# Patient Record
Sex: MALE | Race: WHITE
[De-identification: names, ages, dates, MRNs, and addresses within clinical notes are randomized per-mention and may not be internally consistent; named-entity substitution may affect disease eponyms.]

---

## 2018-10-16 ENCOUNTER — APPOINTMENT (OUTPATIENT)
Dept: FAMILY MEDICINE | Facility: CLINIC | Age: 78
End: 2018-10-16
Payer: MEDICARE

## 2018-10-16 VITALS
HEIGHT: 72 IN | WEIGHT: 185 LBS | HEART RATE: 87 BPM | RESPIRATION RATE: 16 BRPM | OXYGEN SATURATION: 99 % | BODY MASS INDEX: 25.06 KG/M2 | DIASTOLIC BLOOD PRESSURE: 68 MMHG | SYSTOLIC BLOOD PRESSURE: 124 MMHG

## 2018-10-16 DIAGNOSIS — Z00.00 ENCOUNTER FOR GENERAL ADULT MEDICAL EXAMINATION W/OUT ABNORMAL FINDINGS: ICD-10-CM

## 2018-10-16 DIAGNOSIS — M54.12 RADICULOPATHY, CERVICAL REGION: ICD-10-CM

## 2018-10-16 DIAGNOSIS — Z82.3 FAMILY HISTORY OF STROKE: ICD-10-CM

## 2018-10-16 PROCEDURE — 99203 OFFICE O/P NEW LOW 30 MIN: CPT

## 2018-10-16 RX ORDER — ASPIRIN 325 MG/1
325 TABLET ORAL
Refills: 0 | Status: ACTIVE | COMMUNITY

## 2018-10-16 NOTE — PHYSICAL EXAM
[No Acute Distress] : no acute distress [Well Nourished] : well nourished [Well Developed] : well developed [Normal Sclera/Conjunctiva] : normal sclera/conjunctiva [PERRL] : pupils equal round and reactive to light [EOMI] : extraocular movements intact [de-identified] : neck decreased rotation to left, decreased flexion, left shoulder decrased rotation on internal abduction, no joint deformity, no swellin gof joints.

## 2018-10-16 NOTE — ASSESSMENT
[FreeTextEntry1] : oa, most likely cervical strain with radiculopathy, possible shoulder injury\par PT for evaluation/treatment follow up if no improvement

## 2018-10-16 NOTE — HEALTH RISK ASSESSMENT
[] : No [Patient reported colonoscopy was normal] : Patient reported colonoscopy was normal [ColonoscopyDate] : 06/2012

## 2018-10-16 NOTE — HISTORY OF PRESENT ILLNESS
[FreeTextEntry8] : jason has not gone to doctor in years, only to urgi care when needed \par for past few months has had pain in left shoulder, left biceps, some aches however feels achy in joints, knees, elbows feels better as day goes on no swelling in joints

## 2018-10-26 ENCOUNTER — LABORATORY RESULT (OUTPATIENT)
Age: 78
End: 2018-10-26

## 2018-10-26 ENCOUNTER — OTHER (OUTPATIENT)
Age: 78
End: 2018-10-26

## 2018-10-26 DIAGNOSIS — R20.0 ANESTHESIA OF SKIN: ICD-10-CM

## 2018-10-27 ENCOUNTER — LABORATORY RESULT (OUTPATIENT)
Age: 78
End: 2018-10-27

## 2018-10-28 ENCOUNTER — MOBILE ON CALL (OUTPATIENT)
Age: 78
End: 2018-10-28

## 2018-10-29 LAB
ALBUMIN SERPL ELPH-MCNC: 4.1 G/DL
ALP BLD-CCNC: 60 U/L
ALT SERPL-CCNC: 11 U/L
ANION GAP SERPL CALC-SCNC: 14 MMOL/L
AST SERPL-CCNC: 17 U/L
BASOPHILS # BLD AUTO: 0 K/UL
BASOPHILS NFR BLD AUTO: 0 %
BILIRUB SERPL-MCNC: 0.8 MG/DL
BUN SERPL-MCNC: 13 MG/DL
CALCIUM SERPL-MCNC: 9.1 MG/DL
CHLORIDE SERPL-SCNC: 102 MMOL/L
CHOLEST SERPL-MCNC: 111 MG/DL
CHOLEST/HDLC SERPL: 2.4 RATIO
CO2 SERPL-SCNC: 23 MMOL/L
CREAT SERPL-MCNC: 0.76 MG/DL
EOSINOPHIL # BLD AUTO: 0.07 K/UL
EOSINOPHIL NFR BLD AUTO: 2 %
FERRITIN SERPL-MCNC: 826 NG/ML
FOLATE SERPL-MCNC: 17 NG/ML
GLUCOSE SERPL-MCNC: 114 MG/DL
HCT VFR BLD CALC: 28.1 %
HDLC SERPL-MCNC: 46 MG/DL
HGB BLD-MCNC: 9.5 G/DL
IMM GRANULOCYTES NFR BLD AUTO: 0.3 %
IRON SATN MFR SERPL: 61 %
IRON SERPL-MCNC: 143 UG/DL
LDLC SERPL CALC-MCNC: 57 MG/DL
LYMPHOCYTES # BLD AUTO: 1.14 K/UL
LYMPHOCYTES NFR BLD AUTO: 33.2 %
MAN DIFF?: NORMAL
MCHC RBC-ENTMCNC: 33.8 GM/DL
MCHC RBC-ENTMCNC: 39.1 PG
MCV RBC AUTO: 115.6 FL
MONOCYTES # BLD AUTO: 0.68 K/UL
MONOCYTES NFR BLD AUTO: 19.8 %
NEUTROPHILS # BLD AUTO: 1.53 K/UL
NEUTROPHILS NFR BLD AUTO: 44.7 %
PLATELET # BLD AUTO: 309 K/UL
POTASSIUM SERPL-SCNC: 4.4 MMOL/L
PROT SERPL-MCNC: 6.8 G/DL
RBC # BLD: 2.43 M/UL
RBC # FLD: 15 %
SODIUM SERPL-SCNC: 139 MMOL/L
TIBC SERPL-MCNC: 236 UG/DL
TRIGL SERPL-MCNC: 42 MG/DL
UIBC SERPL-MCNC: 93 UG/DL
VIT B12 SERPL-MCNC: 414 PG/ML
WBC # FLD AUTO: 3.43 K/UL

## 2018-10-31 ENCOUNTER — APPOINTMENT (OUTPATIENT)
Dept: HEMATOLOGY ONCOLOGY | Facility: CLINIC | Age: 78
End: 2018-10-31
Payer: MEDICARE

## 2018-10-31 VITALS
TEMPERATURE: 98.7 F | SYSTOLIC BLOOD PRESSURE: 133 MMHG | BODY MASS INDEX: 24.92 KG/M2 | DIASTOLIC BLOOD PRESSURE: 68 MMHG | HEIGHT: 72 IN | HEART RATE: 66 BPM | WEIGHT: 184 LBS

## 2018-10-31 DIAGNOSIS — M19.90 UNSPECIFIED OSTEOARTHRITIS, UNSPECIFIED SITE: ICD-10-CM

## 2018-10-31 DIAGNOSIS — Z78.9 OTHER SPECIFIED HEALTH STATUS: ICD-10-CM

## 2018-10-31 DIAGNOSIS — D72.829 ELEVATED WHITE BLOOD CELL COUNT, UNSPECIFIED: ICD-10-CM

## 2018-10-31 DIAGNOSIS — Z63.4 DISAPPEARANCE AND DEATH OF FAMILY MEMBER: ICD-10-CM

## 2018-10-31 DIAGNOSIS — Z87.81 PERSONAL HISTORY OF (HEALED) TRAUMATIC FRACTURE: ICD-10-CM

## 2018-10-31 DIAGNOSIS — Z82.49 FAMILY HISTORY OF ISCHEMIC HEART DISEASE AND OTHER DISEASES OF THE CIRCULATORY SYSTEM: ICD-10-CM

## 2018-10-31 DIAGNOSIS — D70.8 OTHER NEUTROPENIA: ICD-10-CM

## 2018-10-31 DIAGNOSIS — D63.8 ANEMIA IN OTHER CHRONIC DISEASES CLASSIFIED ELSEWHERE: ICD-10-CM

## 2018-10-31 DIAGNOSIS — Z82.3 FAMILY HISTORY OF STROKE: ICD-10-CM

## 2018-10-31 DIAGNOSIS — Z60.2 PROBLEMS RELATED TO LIVING ALONE: ICD-10-CM

## 2018-10-31 PROCEDURE — 99205 OFFICE O/P NEW HI 60 MIN: CPT

## 2018-10-31 RX ORDER — ASPIRIN 81 MG
81 TABLET, DELAYED RELEASE (ENTERIC COATED) ORAL
Refills: 0 | Status: ACTIVE | COMMUNITY

## 2018-10-31 SDOH — SOCIAL STABILITY - SOCIAL INSECURITY: DISSAPEARANCE AND DEATH OF FAMILY MEMBER: Z63.4

## 2018-10-31 SDOH — SOCIAL STABILITY - SOCIAL INSECURITY: PROBLEMS RELATED TO LIVING ALONE: Z60.2

## 2018-11-09 ENCOUNTER — LABORATORY RESULT (OUTPATIENT)
Age: 78
End: 2018-11-09

## 2018-11-09 LAB
ALBUMIN SERPL ELPH-MCNC: 4.1 G/DL
ALP BLD-CCNC: 63 U/L
ALT SERPL-CCNC: 14 U/L
ANION GAP SERPL CALC-SCNC: 12 MMOL/L
AST SERPL-CCNC: 17 U/L
BASOPHILS # BLD AUTO: 0.01 K/UL
BASOPHILS NFR BLD AUTO: 0.3 %
BILIRUB SERPL-MCNC: 0.5 MG/DL
BUN SERPL-MCNC: 19 MG/DL
CALCIUM SERPL-MCNC: 8.9 MG/DL
CHLORIDE SERPL-SCNC: 103 MMOL/L
CO2 SERPL-SCNC: 22 MMOL/L
CREAT SERPL-MCNC: 0.84 MG/DL
DEPRECATED KAPPA LC FREE/LAMBDA SER: 1.05 RATIO
DEPRECATED KAPPA LC FREE/LAMBDA SER: 1.05 RATIO
EOSINOPHIL # BLD AUTO: 0.09 K/UL
EOSINOPHIL NFR BLD AUTO: 2.9 %
FERRITIN SERPL-MCNC: 789 NG/ML
GLUCOSE SERPL-MCNC: 136 MG/DL
HCT VFR BLD CALC: 29 %
HGB BLD-MCNC: 9.5 G/DL
IGA SER QL IEP: 318 MG/DL
IGG SER QL IEP: 983 MG/DL
IGM SER QL IEP: 53 MG/DL
IMM GRANULOCYTES NFR BLD AUTO: 0.6 %
KAPPA LC CSF-MCNC: 2.19 MG/DL
KAPPA LC CSF-MCNC: 2.19 MG/DL
KAPPA LC SERPL-MCNC: 2.3 MG/DL
KAPPA LC SERPL-MCNC: 2.3 MG/DL
LDH SERPL-CCNC: 205 U/L
LYMPHOCYTES # BLD AUTO: 0.99 K/UL
LYMPHOCYTES NFR BLD AUTO: 31.8 %
MAN DIFF?: NORMAL
MCHC RBC-ENTMCNC: 32.8 GM/DL
MCHC RBC-ENTMCNC: 38.3 PG
MCV RBC AUTO: 116.9 FL
MONOCYTES # BLD AUTO: 0.58 K/UL
MONOCYTES NFR BLD AUTO: 18.6 %
NEUTROPHILS # BLD AUTO: 1.42 K/UL
NEUTROPHILS NFR BLD AUTO: 45.8 %
PLATELET # BLD AUTO: 303 K/UL
POTASSIUM SERPL-SCNC: 4.8 MMOL/L
PROT SERPL-MCNC: 7.1 G/DL
RBC # BLD: 2.48 M/UL
RBC # FLD: 15.3 %
SODIUM SERPL-SCNC: 138 MMOL/L
WBC # FLD AUTO: 3.11 K/UL

## 2018-11-14 LAB
ALBUMIN MFR SERPL ELPH: 58.1 %
ALBUMIN SERPL-MCNC: 4.1 G/DL
ALBUMIN/GLOB SERPL: 1.4 RATIO
ALPHA1 GLOB MFR SERPL ELPH: 5.3 %
ALPHA1 GLOB SERPL ELPH-MCNC: 0.4 G/DL
ALPHA2 GLOB MFR SERPL ELPH: 10.4 %
ALPHA2 GLOB SERPL ELPH-MCNC: 0.7 G/DL
B-GLOBULIN MFR SERPL ELPH: 11.3 %
B-GLOBULIN SERPL ELPH-MCNC: 0.8 G/DL
GAMMA GLOB FLD ELPH-MCNC: 1.1 G/DL
GAMMA GLOB MFR SERPL ELPH: 14.9 %
INTERPRETATION SERPL IEP-IMP: NORMAL
JAK2 GENE MUT ANL BLD/T: NORMAL
M PROTEIN MFR SERPL ELPH: NORMAL %
M PROTEIN SPEC IFE-MCNC: NORMAL
MONOCLON BAND OBS SERPL: NORMAL G/DL
PROT SERPL-MCNC: 7.1 G/DL
PROT SERPL-MCNC: 7.1 G/DL

## 2018-11-19 ENCOUNTER — FORM ENCOUNTER (OUTPATIENT)
Age: 78
End: 2018-11-19

## 2018-11-20 ENCOUNTER — APPOINTMENT (OUTPATIENT)
Dept: HEMATOLOGY ONCOLOGY | Facility: CLINIC | Age: 78
End: 2018-11-20
Payer: MEDICARE

## 2018-11-20 ENCOUNTER — OUTPATIENT (OUTPATIENT)
Dept: OUTPATIENT SERVICES | Facility: HOSPITAL | Age: 78
LOS: 1 days | End: 2018-11-20
Payer: MEDICARE

## 2018-11-20 VITALS — HEART RATE: 72 BPM | SYSTOLIC BLOOD PRESSURE: 123 MMHG | DIASTOLIC BLOOD PRESSURE: 88 MMHG | TEMPERATURE: 97.6 F

## 2018-11-20 DIAGNOSIS — D47.2 MONOCLONAL GAMMOPATHY: ICD-10-CM

## 2018-11-20 PROCEDURE — 77074 RADEX OSSEOUS SURVEY LMTD: CPT

## 2018-11-20 PROCEDURE — 99214 OFFICE O/P EST MOD 30 MIN: CPT

## 2018-11-20 PROCEDURE — 77074 RADEX OSSEOUS SURVEY LMTD: CPT | Mod: 26

## 2018-11-20 NOTE — HISTORY OF PRESENT ILLNESS
[de-identified] : 77 years old  male referred for hematologic evaluation for anemia and leukopenia.  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 His symptoms (muscular stiffness, back and joint pain) resolved since he was started on low dose Prednisone by rheumatologist. Appetite and weight increased since the start of steroid therapy. Medication list reviewed; stopped taking Voltaren for pain.Appears in good spirits, not toxic. Last colonoscopy done 5 years ago, reportedly negative. Continues to work in construction. Accompanied by Deanna, one of his daughters in law.

## 2018-11-20 NOTE — REASON FOR VISIT
[Follow-Up Visit] : a follow-up visit for [Family Member] : family member [FreeTextEntry2] : monoclonal gammopathy

## 2018-11-20 NOTE — ASSESSMENT
[FreeTextEntry1] : Mr. MAE's questions were answered to his satisfaction. He expressed his understanding and willingness to comply with the above recommendations, and will return to the new office location in 1 month.\par \par

## 2018-11-20 NOTE — REVIEW OF SYSTEMS
[Fatigue] : fatigue [Recent Change In Weight] : ~T recent weight change [Joint Pain] : joint pain [Joint Stiffness] : joint stiffness [Muscle Pain] : muscle pain [Negative] : Integumentary [Chills] : no chills [Night Sweats] : no night sweats [FreeTextEntry2] : lost 15 lbs in the past year [de-identified] : anemia and leukopenia

## 2018-11-20 NOTE — PHYSICAL EXAM
[Ambulatory and capable of all self care but unable to carry out any work activities] : Status 2- Ambulatory and capable of all self care but unable to carry out any work activities. Up and about more than 50% of waking hours [Normal] : normoactive bowel sounds, soft and nontender, no hepatosplenomegaly or masses appreciated [de-identified] : limited range of motion in both shoulders and hips.

## 2018-12-20 ENCOUNTER — APPOINTMENT (OUTPATIENT)
Dept: HEMATOLOGY ONCOLOGY | Facility: CLINIC | Age: 78
End: 2018-12-20

## 2019-01-27 ENCOUNTER — EMERGENCY (EMERGENCY)
Facility: HOSPITAL | Age: 79
LOS: 0 days | Discharge: HOME | End: 2019-01-27
Attending: EMERGENCY MEDICINE | Admitting: NURSE PRACTITIONER
Payer: MEDICARE

## 2019-01-27 VITALS
HEART RATE: 85 BPM | DIASTOLIC BLOOD PRESSURE: 74 MMHG | TEMPERATURE: 98 F | OXYGEN SATURATION: 97 % | RESPIRATION RATE: 16 BRPM | SYSTOLIC BLOOD PRESSURE: 144 MMHG

## 2019-01-27 VITALS — WEIGHT: 184.97 LBS

## 2019-01-27 DIAGNOSIS — S01.81XA LACERATION WITHOUT FOREIGN BODY OF OTHER PART OF HEAD, INITIAL ENCOUNTER: ICD-10-CM
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
## 2019-01-27 DIAGNOSIS — S60.511A ABRASION OF RIGHT HAND, INITIAL ENCOUNTER: ICD-10-CM

## 2019-01-27 DIAGNOSIS — H26.9 UNSPECIFIED CATARACT: ICD-10-CM

## 2019-01-27 DIAGNOSIS — M79.7 FIBROMYALGIA: ICD-10-CM
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
## 2019-01-27 DIAGNOSIS — S50.311A ABRASION OF RIGHT ELBOW, INITIAL ENCOUNTER: ICD-10-CM

## 2019-01-27 DIAGNOSIS — S09.90XA UNSPECIFIED INJURY OF HEAD, INITIAL ENCOUNTER: ICD-10-CM

## 2019-01-27 LAB
ALBUMIN SERPL ELPH-MCNC: 3.9 G/DL — SIGNIFICANT CHANGE UP (ref 3.3–5)
ALP SERPL-CCNC: 65 U/L — SIGNIFICANT CHANGE UP (ref 40–120)
ALT FLD-CCNC: 23 U/L — SIGNIFICANT CHANGE UP (ref 12–78)
ANION GAP SERPL CALC-SCNC: 7 MMOL/L — SIGNIFICANT CHANGE UP (ref 5–17)
ANISOCYTOSIS BLD QL: SLIGHT — SIGNIFICANT CHANGE UP
APTT BLD: 23.9 SEC — LOW (ref 27.5–36.3)
AST SERPL-CCNC: 19 U/L — SIGNIFICANT CHANGE UP (ref 15–37)
BASOPHILS # BLD AUTO: 0.01 K/UL — SIGNIFICANT CHANGE UP (ref 0–0.2)
BASOPHILS NFR BLD AUTO: 0.3 % — SIGNIFICANT CHANGE UP (ref 0–2)
BILIRUB SERPL-MCNC: 1.1 MG/DL — SIGNIFICANT CHANGE UP (ref 0.2–1.2)
BLD GP AB SCN SERPL QL: SIGNIFICANT CHANGE UP
BUN SERPL-MCNC: 17 MG/DL — SIGNIFICANT CHANGE UP (ref 7–23)
CALCIUM SERPL-MCNC: 8.5 MG/DL — SIGNIFICANT CHANGE UP (ref 8.5–10.1)
CHLORIDE SERPL-SCNC: 110 MMOL/L — HIGH (ref 96–108)
CO2 SERPL-SCNC: 25 MMOL/L — SIGNIFICANT CHANGE UP (ref 22–31)
CREAT SERPL-MCNC: 0.96 MG/DL — SIGNIFICANT CHANGE UP (ref 0.5–1.3)
EOSINOPHIL # BLD AUTO: 0 K/UL — SIGNIFICANT CHANGE UP (ref 0–0.5)
EOSINOPHIL NFR BLD AUTO: 0 % — SIGNIFICANT CHANGE UP (ref 0–6)
GLUCOSE SERPL-MCNC: 116 MG/DL — HIGH (ref 70–99)
HCT VFR BLD CALC: 31.1 % — LOW (ref 39–50)
HGB BLD-MCNC: 10.7 G/DL — LOW (ref 13–17)
IMM GRANULOCYTES NFR BLD AUTO: 0.6 % — SIGNIFICANT CHANGE UP (ref 0–1.5)
INR BLD: 1.07 RATIO — SIGNIFICANT CHANGE UP (ref 0.88–1.16)
LG PLATELETS BLD QL AUTO: SLIGHT — SIGNIFICANT CHANGE UP
LYMPHOCYTES # BLD AUTO: 1.01 K/UL — SIGNIFICANT CHANGE UP (ref 1–3.3)
LYMPHOCYTES # BLD AUTO: 28.3 % — SIGNIFICANT CHANGE UP (ref 13–44)
MACROCYTES BLD QL: SIGNIFICANT CHANGE UP
MANUAL SMEAR VERIFICATION: SIGNIFICANT CHANGE UP
MCHC RBC-ENTMCNC: 34.4 GM/DL — SIGNIFICANT CHANGE UP (ref 32–36)
MCHC RBC-ENTMCNC: 41 PG — HIGH (ref 27–34)
MCV RBC AUTO: 119.2 FL — HIGH (ref 80–100)
MONOCYTES # BLD AUTO: 0.56 K/UL — SIGNIFICANT CHANGE UP (ref 0–0.9)
MONOCYTES NFR BLD AUTO: 15.7 % — HIGH (ref 2–14)
NEUTROPHILS # BLD AUTO: 1.97 K/UL — SIGNIFICANT CHANGE UP (ref 1.8–7.4)
NEUTROPHILS NFR BLD AUTO: 55.1 % — SIGNIFICANT CHANGE UP (ref 43–77)
NRBC # BLD: 0 /100 WBCS — SIGNIFICANT CHANGE UP (ref 0–0)
PLAT MORPH BLD: NORMAL — SIGNIFICANT CHANGE UP
PLATELET # BLD AUTO: 242 K/UL — SIGNIFICANT CHANGE UP (ref 150–400)
POIKILOCYTOSIS BLD QL AUTO: SLIGHT — SIGNIFICANT CHANGE UP
POTASSIUM SERPL-MCNC: 3.8 MMOL/L — SIGNIFICANT CHANGE UP (ref 3.5–5.3)
POTASSIUM SERPL-SCNC: 3.8 MMOL/L — SIGNIFICANT CHANGE UP (ref 3.5–5.3)
PROT SERPL-MCNC: 7.1 GM/DL — SIGNIFICANT CHANGE UP (ref 6–8.3)
PROTHROM AB SERPL-ACNC: 11.9 SEC — SIGNIFICANT CHANGE UP (ref 10–12.9)
RBC # BLD: 2.61 M/UL — LOW (ref 4.2–5.8)
RBC # FLD: 14.6 % — HIGH (ref 10.3–14.5)
RBC BLD AUTO: ABNORMAL
SODIUM SERPL-SCNC: 142 MMOL/L — SIGNIFICANT CHANGE UP (ref 135–145)
TYPE + AB SCN PNL BLD: SIGNIFICANT CHANGE UP
WBC # BLD: 3.57 K/UL — LOW (ref 3.8–10.5)
WBC # FLD AUTO: 3.57 K/UL — LOW (ref 3.8–10.5)

## 2019-01-27 PROCEDURE — 73030 X-RAY EXAM OF SHOULDER: CPT | Mod: 26,RT

## 2019-01-27 PROCEDURE — 73080 X-RAY EXAM OF ELBOW: CPT | Mod: 26,RT

## 2019-01-27 PROCEDURE — 72125 CT NECK SPINE W/O DYE: CPT | Mod: 26

## 2019-01-27 PROCEDURE — 71045 X-RAY EXAM CHEST 1 VIEW: CPT | Mod: 26

## 2019-01-27 PROCEDURE — 70450 CT HEAD/BRAIN W/O DYE: CPT | Mod: 26

## 2019-01-27 PROCEDURE — 99284 EMERGENCY DEPT VISIT MOD MDM: CPT

## 2019-01-27 PROCEDURE — 93010 ELECTROCARDIOGRAM REPORT: CPT

## 2019-01-27 RX ORDER — TETANUS TOXOID, REDUCED DIPHTHERIA TOXOID AND ACELLULAR PERTUSSIS VACCINE, ADSORBED 5; 2.5; 8; 8; 2.5 [IU]/.5ML; [IU]/.5ML; UG/.5ML; UG/.5ML; UG/.5ML
0.5 SUSPENSION INTRAMUSCULAR ONCE
Qty: 0 | Refills: 0 | Status: COMPLETED | OUTPATIENT
Start: 2019-01-27 | End: 2019-01-27

## 2019-01-27 RX ADMIN — TETANUS TOXOID, REDUCED DIPHTHERIA TOXOID AND ACELLULAR PERTUSSIS VACCINE, ADSORBED 0.5 MILLILITER(S): 5; 2.5; 8; 8; 2.5 SUSPENSION INTRAMUSCULAR at 16:08

## 2019-01-27 NOTE — ED PROCEDURE NOTE - PROCEDURE ADDITIONAL DETAILS
Right Elbow abrasions: # 4 circular abrasions approximately  0.5cm in diameter cleansed with saline, bacitracin ointment and DSD applied. Right Hand: #2 2cm skin tears cleansed with saline ,bacitracin ointment and DSD applied. MTangredi NP

## 2019-01-27 NOTE — ED ADULT TRIAGE NOTE - CHIEF COMPLAINT QUOTE
unwitnessed fall down 6 basement wood steps, onto cement causing laceration to left forehead unwitnessed fall down 6 basement wood steps, onto cement causing laceration to left forehead. denies loc, no blood thinners.  trauma alert called from triage

## 2019-01-27 NOTE — ED PROVIDER NOTE - PROGRESS NOTE DETAILS
D/W Plastics -- will see Seen and repair of left forehead laceration by Dr. Dawson. Will have patient F/U in office on Monday next week.  Mode NP Patient ambulatory after procedure with Dr. Dawson . Reports his right arm has been weak for the past few months. He reports he is seeing a Rhematologist for his weakness. Mode GARRIDO

## 2019-01-27 NOTE — ED PROVIDER NOTE - NEUROLOGICAL, MLM
Alert and oriented to person and place unable to state year, Right Upper extremity : weak unable to raise arm, sensation intact.  No other motor or sensory deficits.

## 2019-01-27 NOTE — ED PROVIDER NOTE - OBJECTIVE STATEMENT
78 yr./ old male PMH: Pelvis Fx. 15 yrs. ago, Cataract, Fibromyalgia presents to ED with family after falling down 7 wood stairs today tripped on under wear approximately 11 am unwitnessed. Patient reports he hit head but no LOC, Disoriented to time . Laceration to left forehead above eye, abrasions on left elbow and left hand.  No chest pain or dizziness or difficulty breathing  before fall. Drinks a beer and wine daily. Non smoker. 78 yr./ old male PMH: Pelvis Fx. 15 yrs. ago, Cataract, Fibromyalgia presents to ED with family after falling down 7 wood stairs today tripped on under wear approximately 11 am unwitnessed. Patient reports he hit head but no LOC, Disoriented to time . Laceration to left forehead above eye, abrasions on left elbow and left hand.  No chest pain or dizziness or difficulty breathing  before fall. Drinks a beer and wine daily. Non smoker. No recent travel 78 yr./ old male PMH: Pelvis Fx. 15 yrs. ago, Cataract, Fibromyalgia presents to ED with family after falling down 7 wood stairs today tripped on under wear approximately 11 am unwitnessed. Patient reports he hit head but no LOC, disoriented to time . Laceration to left forehead above eye, abrasions on left elbow and left hand.  No chest pain or dizziness or difficulty breathing  before fall. Drinks a beer and wine daily. Non smoker. No recent travel Last tdap ?? 78 yr./ old male PMH: Pelvis Fx. 15 yrs. ago, Cataract, Fibromyalgia presents to ED with family after falling down 7 wood stairs today tripped on under wear approximately 11 am unwitnessed. Patient reports he hit head but no LOC, disoriented to time (family reports this is his baseline)  . Laceration to left forehead above eye, abrasions on left elbow and left hand.  No chest pain or dizziness or difficulty breathing  before fall. Drinks a beer and wine daily. Non smoker. No recent travel Last tdap ?? 78 yr./ old male PMH: Pelvis Fx. 15 yrs. ago, Cataract, Fibromyalgia presents to ED with family after falling down 7 wood stairs today tripped on under wear approximately 11 am unwitnessed. Patient reports he hit head but no LOC, disoriented to time (family reports this is his baseline)  . Laceration to left forehead above eye, abrasions on left elbow and left hand.  No chest pain or dizziness or difficulty breathing  before fall. Not on any blood thinners. Drinks a beer and wine daily. Non smoker. No recent travel Last tdap ??

## 2019-01-27 NOTE — ED PROVIDER NOTE - MUSCULOSKELETAL, MLM
Spine appears normal, range of motion is not limited, + right elbow tenderness with abrasions and swelling , flexion and extension of elbow normal but painful at elbow unable to raise right arm mild tenderness humerus, no ecchymosis or abrasions, Right Hand + abrasion and skin tear to dorsum of hand , radial pulse + strong grasp.

## 2019-01-27 NOTE — ED ADULT NURSE NOTE - CHIEF COMPLAINT QUOTE
unwitnessed fall down 6 basement wood steps, onto cement causing laceration to left forehead. denies loc, no blood thinners.  trauma alert called from triage

## 2019-01-27 NOTE — ED ADULT NURSE NOTE - NSIMPLEMENTINTERV_GEN_ALL_ED
Implemented All Fall Risk Interventions:  [place] to call system. Call bell, personal items and telephone within reach. Instruct patient to call for assistance. Room bathroom lighting operational. Non-slip footwear when patient is off stretcher. Physically safe environment: no spills, clutter or unnecessary equipment. Stretcher in lowest position, wheels locked, appropriate side rails in place. Provide visual cue, wrist band, yellow gown, etc. Monitor gait and stability. Monitor for mental status changes and reorient to person, place, and time. Review medications for side effects contributing to fall risk. Reinforce activity limits and safety measures with patient and family.

## 2019-01-27 NOTE — ED ADULT NURSE REASSESSMENT NOTE - NS ED NURSE REASSESS COMMENT FT1
Report received from Rosana Gonzalez RN, care assumed at this time. Rt brow laceration clean and dry; awaiting plastics for lac repair. Patient comfortable at this time. Cardiac monitoring in place. Call bell within reach. Patient able to make all needs known. Comfort and safety maintained. Will continue to monitor.

## 2019-01-27 NOTE — ED PROVIDER NOTE - ATTENDING CONTRIBUTION TO CARE
78M trip and fall down ~7 steps. No LOC, (+) lac to left forehead. Also pain right shoulder with limited ROM d/t pain.  Plan: CT head/csp, XR right shoulder, elbow. Plastics for flap lac -> eyebrow.

## 2019-01-27 NOTE — ED PROVIDER NOTE - CONSTITUTIONAL, MLM
normal... Well appearing, well nourished, awake, alert, oriented to person, place, disoriented time year /oriented to situation and in no apparent distress.

## 2019-02-04 ENCOUNTER — APPOINTMENT (OUTPATIENT)
Dept: FAMILY MEDICINE | Facility: CLINIC | Age: 79
End: 2019-02-04

## 2019-09-10 ENCOUNTER — APPOINTMENT (OUTPATIENT)
Dept: FAMILY MEDICINE | Facility: CLINIC | Age: 79
End: 2019-09-10
Payer: MEDICARE

## 2019-09-10 VITALS
WEIGHT: 193 LBS | DIASTOLIC BLOOD PRESSURE: 68 MMHG | SYSTOLIC BLOOD PRESSURE: 120 MMHG | HEART RATE: 90 BPM | RESPIRATION RATE: 16 BRPM | BODY MASS INDEX: 26.14 KG/M2 | HEIGHT: 72 IN | OXYGEN SATURATION: 95 %

## 2019-09-10 PROCEDURE — 99214 OFFICE O/P EST MOD 30 MIN: CPT

## 2019-09-10 NOTE — HISTORY OF PRESENT ILLNESS
[FreeTextEntry8] : pain in joints and legs\par patient with diagnosis of polymyalgia rheumatica, was on pred 5 once or twice a day but ran out\par states he has no side effects from prednisone such as gi side effects, swelling or psychological issues\par has not yet been back to see dr veloz re monoclonal gammopathy\par no fevers no chills, still working full time

## 2019-10-02 PROBLEM — Z60.2 PERSON LIVING ALONE: Status: ACTIVE | Noted: 2018-10-31

## 2020-04-28 ENCOUNTER — APPOINTMENT (OUTPATIENT)
Dept: FAMILY MEDICINE | Facility: CLINIC | Age: 80
End: 2020-04-28
Payer: MEDICARE

## 2020-04-28 DIAGNOSIS — R81 GLYCOSURIA: ICD-10-CM

## 2020-04-28 PROCEDURE — 99213 OFFICE O/P EST LOW 20 MIN: CPT | Mod: 95

## 2020-04-28 NOTE — PHYSICAL EXAM
[Normal] : no respiratory distress, lungs were clear to auscultation bilaterally and no accessory muscle use [de-identified] : patient stting in car appearing well

## 2020-05-11 ENCOUNTER — APPOINTMENT (OUTPATIENT)
Dept: FAMILY MEDICINE | Facility: CLINIC | Age: 80
End: 2020-05-11
Payer: MEDICARE

## 2020-05-11 DIAGNOSIS — D47.2 MONOCLONAL GAMMOPATHY: ICD-10-CM

## 2020-05-11 PROCEDURE — 99213 OFFICE O/P EST LOW 20 MIN: CPT | Mod: 95

## 2020-05-12 ENCOUNTER — INPATIENT (INPATIENT)
Facility: HOSPITAL | Age: 80
LOS: 2 days | Discharge: HOME | DRG: 871 | End: 2020-05-15
Attending: HOSPITALIST | Admitting: HOSPITALIST
Payer: MEDICARE

## 2020-05-12 VITALS — WEIGHT: 175.05 LBS | HEIGHT: 71 IN

## 2020-05-12 DIAGNOSIS — N39.0 URINARY TRACT INFECTION, SITE NOT SPECIFIED: ICD-10-CM

## 2020-05-12 PROBLEM — D47.2 MONOCLONAL GAMMOPATHY: Status: ACTIVE | Noted: 2018-11-20

## 2020-05-12 LAB
ALBUMIN SERPL ELPH-MCNC: 3.7 G/DL — SIGNIFICANT CHANGE UP (ref 3.3–5)
ALP SERPL-CCNC: 56 U/L — SIGNIFICANT CHANGE UP (ref 40–120)
ALT FLD-CCNC: 41 U/L — SIGNIFICANT CHANGE UP (ref 12–78)
ANION GAP SERPL CALC-SCNC: 11 MMOL/L — SIGNIFICANT CHANGE UP (ref 5–17)
APPEARANCE UR: ABNORMAL
APTT BLD: 20.2 SEC — LOW (ref 27.5–36.3)
AST SERPL-CCNC: 25 U/L — SIGNIFICANT CHANGE UP (ref 15–37)
BASOPHILS # BLD AUTO: 0 K/UL — SIGNIFICANT CHANGE UP (ref 0–0.2)
BASOPHILS NFR BLD AUTO: 0 % — SIGNIFICANT CHANGE UP (ref 0–2)
BILIRUB SERPL-MCNC: 1.2 MG/DL — SIGNIFICANT CHANGE UP (ref 0.2–1.2)
BILIRUB UR-MCNC: ABNORMAL
BUN SERPL-MCNC: 24 MG/DL — HIGH (ref 7–23)
CALCIUM SERPL-MCNC: 8.6 MG/DL — SIGNIFICANT CHANGE UP (ref 8.5–10.1)
CHLORIDE SERPL-SCNC: 107 MMOL/L — SIGNIFICANT CHANGE UP (ref 96–108)
CO2 SERPL-SCNC: 20 MMOL/L — LOW (ref 22–31)
COLOR SPEC: YELLOW — SIGNIFICANT CHANGE UP
CREAT SERPL-MCNC: 1.43 MG/DL — HIGH (ref 0.5–1.3)
DIFF PNL FLD: ABNORMAL
EOSINOPHIL # BLD AUTO: 0.09 K/UL — SIGNIFICANT CHANGE UP (ref 0–0.5)
EOSINOPHIL NFR BLD AUTO: 1 % — SIGNIFICANT CHANGE UP (ref 0–6)
GLUCOSE SERPL-MCNC: 121 MG/DL — HIGH (ref 70–99)
GLUCOSE UR QL: NEGATIVE MG/DL — SIGNIFICANT CHANGE UP
HCT VFR BLD CALC: 30.5 % — LOW (ref 39–50)
HGB BLD-MCNC: 10.7 G/DL — LOW (ref 13–17)
INR BLD: 1.2 RATIO — HIGH (ref 0.88–1.16)
KETONES UR-MCNC: ABNORMAL
LACTATE SERPL-SCNC: 3.1 MMOL/L — HIGH (ref 0.7–2)
LEUKOCYTE ESTERASE UR-ACNC: ABNORMAL
LYMPHOCYTES # BLD AUTO: 0.17 K/UL — LOW (ref 1–3.3)
LYMPHOCYTES # BLD AUTO: 2 % — LOW (ref 13–44)
MCHC RBC-ENTMCNC: 35.1 GM/DL — SIGNIFICANT CHANGE UP (ref 32–36)
MCHC RBC-ENTMCNC: 39.8 PG — HIGH (ref 27–34)
MCV RBC AUTO: 113.4 FL — HIGH (ref 80–100)
MONOCYTES # BLD AUTO: 0.17 K/UL — SIGNIFICANT CHANGE UP (ref 0–0.9)
MONOCYTES NFR BLD AUTO: 2 % — SIGNIFICANT CHANGE UP (ref 2–14)
NEUTROPHILS # BLD AUTO: 8.21 K/UL — HIGH (ref 1.8–7.4)
NEUTROPHILS NFR BLD AUTO: 93 % — HIGH (ref 43–77)
NITRITE UR-MCNC: POSITIVE
NRBC # BLD: SIGNIFICANT CHANGE UP /100 WBCS (ref 0–0)
PH UR: 5 — SIGNIFICANT CHANGE UP (ref 5–8)
PLATELET # BLD AUTO: 272 K/UL — SIGNIFICANT CHANGE UP (ref 150–400)
POTASSIUM SERPL-MCNC: 3.1 MMOL/L — LOW (ref 3.5–5.3)
POTASSIUM SERPL-SCNC: 3.1 MMOL/L — LOW (ref 3.5–5.3)
PROT SERPL-MCNC: 7 GM/DL — SIGNIFICANT CHANGE UP (ref 6–8.3)
PROT UR-MCNC: 30 MG/DL
PROTHROM AB SERPL-ACNC: 13.4 SEC — HIGH (ref 10–12.9)
RBC # BLD: 2.69 M/UL — LOW (ref 4.2–5.8)
RBC # FLD: 14.8 % — HIGH (ref 10.3–14.5)
SARS-COV-2 RNA SPEC QL NAA+PROBE: SIGNIFICANT CHANGE UP
SODIUM SERPL-SCNC: 138 MMOL/L — SIGNIFICANT CHANGE UP (ref 135–145)
SP GR SPEC: 1.02 — SIGNIFICANT CHANGE UP (ref 1.01–1.02)
UROBILINOGEN FLD QL: NEGATIVE MG/DL — SIGNIFICANT CHANGE UP
WBC # BLD: 8.64 K/UL — SIGNIFICANT CHANGE UP (ref 3.8–10.5)
WBC # FLD AUTO: 8.64 K/UL — SIGNIFICANT CHANGE UP (ref 3.8–10.5)

## 2020-05-12 PROCEDURE — 84100 ASSAY OF PHOSPHORUS: CPT

## 2020-05-12 PROCEDURE — 71045 X-RAY EXAM CHEST 1 VIEW: CPT | Mod: 26

## 2020-05-12 PROCEDURE — 80048 BASIC METABOLIC PNL TOTAL CA: CPT

## 2020-05-12 PROCEDURE — 85025 COMPLETE CBC W/AUTO DIFF WBC: CPT

## 2020-05-12 PROCEDURE — 93010 ELECTROCARDIOGRAM REPORT: CPT

## 2020-05-12 PROCEDURE — 83735 ASSAY OF MAGNESIUM: CPT

## 2020-05-12 PROCEDURE — 83540 ASSAY OF IRON: CPT

## 2020-05-12 PROCEDURE — 82746 ASSAY OF FOLIC ACID SERUM: CPT

## 2020-05-12 PROCEDURE — 36415 COLL VENOUS BLD VENIPUNCTURE: CPT

## 2020-05-12 PROCEDURE — 82728 ASSAY OF FERRITIN: CPT

## 2020-05-12 PROCEDURE — 83550 IRON BINDING TEST: CPT

## 2020-05-12 PROCEDURE — 83036 HEMOGLOBIN GLYCOSYLATED A1C: CPT

## 2020-05-12 PROCEDURE — 80053 COMPREHEN METABOLIC PANEL: CPT

## 2020-05-12 PROCEDURE — 83605 ASSAY OF LACTIC ACID: CPT

## 2020-05-12 PROCEDURE — 82607 VITAMIN B-12: CPT

## 2020-05-12 RX ORDER — AZTREONAM 2 G
1 VIAL (EA) INJECTION
Qty: 0 | Refills: 0 | DISCHARGE
Start: 2020-05-12

## 2020-05-12 RX ORDER — ACETAMINOPHEN 500 MG
650 TABLET ORAL ONCE
Refills: 0 | Status: COMPLETED | OUTPATIENT
Start: 2020-05-12 | End: 2020-05-12

## 2020-05-12 RX ORDER — SODIUM CHLORIDE 9 MG/ML
2500 INJECTION INTRAMUSCULAR; INTRAVENOUS; SUBCUTANEOUS ONCE
Refills: 0 | Status: COMPLETED | OUTPATIENT
Start: 2020-05-12 | End: 2020-05-12

## 2020-05-12 RX ORDER — SULFAMETHOXAZOLE AND TRIMETHOPRIM 800; 160 MG/1; MG/1
800-160 TABLET ORAL TWICE DAILY
Qty: 10 | Refills: 0 | Status: ACTIVE | COMMUNITY
Start: 2020-05-12 | End: 1900-01-01

## 2020-05-12 RX ORDER — POTASSIUM CHLORIDE 20 MEQ
40 PACKET (EA) ORAL ONCE
Refills: 0 | Status: COMPLETED | OUTPATIENT
Start: 2020-05-12 | End: 2020-05-12

## 2020-05-12 RX ORDER — CEFTRIAXONE 500 MG/1
1000 INJECTION, POWDER, FOR SOLUTION INTRAMUSCULAR; INTRAVENOUS ONCE
Refills: 0 | Status: COMPLETED | OUTPATIENT
Start: 2020-05-12 | End: 2020-05-12

## 2020-05-12 RX ADMIN — SODIUM CHLORIDE 2500 MILLILITER(S): 9 INJECTION INTRAMUSCULAR; INTRAVENOUS; SUBCUTANEOUS at 22:40

## 2020-05-12 RX ADMIN — Medication 650 MILLIGRAM(S): at 21:39

## 2020-05-12 RX ADMIN — Medication 40 MILLIEQUIVALENT(S): at 23:28

## 2020-05-12 RX ADMIN — CEFTRIAXONE 1000 MILLIGRAM(S): 500 INJECTION, POWDER, FOR SOLUTION INTRAMUSCULAR; INTRAVENOUS at 21:39

## 2020-05-12 NOTE — PHYSICAL EXAM
[No Respiratory Distress] : no respiratory distress  [Normal] : affect was normal and insight and judgment were intact [de-identified] : looks well, sitting up in car

## 2020-05-12 NOTE — ED PROVIDER NOTE - OBJECTIVE STATEMENT
80 y/o male with no relevant PMHx presents to the ED c/o urinary symptoms x2 weeks. +urinary frequency, dysuria, fever. Pt was on a course of abx that he finished a few days ago, is unsure which abx. Today pt was started on Bactrim. Reportedly had rigors today. Denies abd pain, cough, SOB.

## 2020-05-12 NOTE — ED ADULT NURSE NOTE - OBJECTIVE STATEMENT
pt presents to ED with complaints of fever at home. tmax 102.7 at home. pt dx with UTI at urgent care today and placed on bactrim. pt also treated for UTI with bactrim 2 weeks ago. 18 g placed to left AC and right FA. labs sent. EKG performed. Tele and VS monitoring initiated.

## 2020-05-12 NOTE — ED ADULT NURSE NOTE - CHIEF COMPLAINT QUOTE
Treated with ABX Bactrim 2 weeks ago for UTI. c/o frequent burning on urination starting Friday. Developed fever Tmax 102.7, weakness & N/V today. Took tylenol 2 hours ago. Was seen by urgent care today, prescribed ABX bactrim.

## 2020-05-12 NOTE — HISTORY OF PRESENT ILLNESS
[Home] : at home, [unfilled] , at the time of the visit. [Other Location: e.g. Home (Enter Location, City,State)___] : at [unfilled] [Family Member] : family member [Patient] : the patient [Self] : self [FreeTextEntry1] : here for foll [de-identified] : patient was treated in urgi care for UTI\par felt better, finished bactrim about 3 days ago and developed urinary frequency and pain while urinating again\par no fevers, no chills , no back pain, eating and drinking well. has energy\par daughter in law natan states he was on it for 7 days

## 2020-05-12 NOTE — ED ADULT TRIAGE NOTE - CHIEF COMPLAINT QUOTE
Treated with ABX Bactrim 2 weeks ago for UTI. c/o frequent burning on urination starting Friday. Developed fever Tmax 102.7, weakness & N/V. Took tylenol 2 hours ago. Was seen by urgent care today, prescribed ABX bactrim. Treated with ABX Bactrim 2 weeks ago for UTI. c/o frequent burning on urination starting Friday. Developed fever Tmax 102.7, weakness & N/V today. Took tylenol 2 hours ago. Was seen by urgent care today, prescribed ABX bactrim.

## 2020-05-13 LAB
A1C WITH ESTIMATED AVERAGE GLUCOSE RESULT: 5.3 % — SIGNIFICANT CHANGE UP (ref 4–5.6)
ALBUMIN SERPL ELPH-MCNC: 3 G/DL — LOW (ref 3.3–5)
ALP SERPL-CCNC: 43 U/L — SIGNIFICANT CHANGE UP (ref 40–120)
ALT FLD-CCNC: 38 U/L — SIGNIFICANT CHANGE UP (ref 12–78)
ANION GAP SERPL CALC-SCNC: 7 MMOL/L — SIGNIFICANT CHANGE UP (ref 5–17)
AST SERPL-CCNC: 24 U/L — SIGNIFICANT CHANGE UP (ref 15–37)
BASOPHILS # BLD AUTO: 0.01 K/UL — SIGNIFICANT CHANGE UP (ref 0–0.2)
BASOPHILS NFR BLD AUTO: 0.1 % — SIGNIFICANT CHANGE UP (ref 0–2)
BILIRUB SERPL-MCNC: 1 MG/DL — SIGNIFICANT CHANGE UP (ref 0.2–1.2)
BUN SERPL-MCNC: 19 MG/DL — SIGNIFICANT CHANGE UP (ref 7–23)
CALCIUM SERPL-MCNC: 7.7 MG/DL — LOW (ref 8.5–10.1)
CHLORIDE SERPL-SCNC: 109 MMOL/L — HIGH (ref 96–108)
CO2 SERPL-SCNC: 21 MMOL/L — LOW (ref 22–31)
CREAT SERPL-MCNC: 1.19 MG/DL — SIGNIFICANT CHANGE UP (ref 0.5–1.3)
EOSINOPHIL # BLD AUTO: 0 K/UL — SIGNIFICANT CHANGE UP (ref 0–0.5)
EOSINOPHIL NFR BLD AUTO: 0 % — SIGNIFICANT CHANGE UP (ref 0–6)
ESTIMATED AVERAGE GLUCOSE: 105 MG/DL — SIGNIFICANT CHANGE UP (ref 68–114)
GLUCOSE SERPL-MCNC: 118 MG/DL — HIGH (ref 70–99)
HCT VFR BLD CALC: 28.1 % — LOW (ref 39–50)
HGB BLD-MCNC: 9.4 G/DL — LOW (ref 13–17)
IMM GRANULOCYTES NFR BLD AUTO: 1 % — SIGNIFICANT CHANGE UP (ref 0–1.5)
LACTATE SERPL-SCNC: 2.8 MMOL/L — HIGH (ref 0.7–2)
LYMPHOCYTES # BLD AUTO: 0.21 K/UL — LOW (ref 1–3.3)
LYMPHOCYTES # BLD AUTO: 2.6 % — LOW (ref 13–44)
MAGNESIUM SERPL-MCNC: 1.7 MG/DL — SIGNIFICANT CHANGE UP (ref 1.6–2.6)
MCHC RBC-ENTMCNC: 33.5 GM/DL — SIGNIFICANT CHANGE UP (ref 32–36)
MCHC RBC-ENTMCNC: 39.2 PG — HIGH (ref 27–34)
MCV RBC AUTO: 117.1 FL — HIGH (ref 80–100)
MONOCYTES # BLD AUTO: 0.39 K/UL — SIGNIFICANT CHANGE UP (ref 0–0.9)
MONOCYTES NFR BLD AUTO: 4.8 % — SIGNIFICANT CHANGE UP (ref 2–14)
NEUTROPHILS # BLD AUTO: 7.4 K/UL — SIGNIFICANT CHANGE UP (ref 1.8–7.4)
NEUTROPHILS NFR BLD AUTO: 91.5 % — HIGH (ref 43–77)
PHOSPHATE SERPL-MCNC: 2.3 MG/DL — LOW (ref 2.5–4.5)
PLATELET # BLD AUTO: 231 K/UL — SIGNIFICANT CHANGE UP (ref 150–400)
POTASSIUM SERPL-MCNC: 4.4 MMOL/L — SIGNIFICANT CHANGE UP (ref 3.5–5.3)
POTASSIUM SERPL-SCNC: 4.4 MMOL/L — SIGNIFICANT CHANGE UP (ref 3.5–5.3)
PROT SERPL-MCNC: 6.2 GM/DL — SIGNIFICANT CHANGE UP (ref 6–8.3)
RBC # BLD: 2.4 M/UL — LOW (ref 4.2–5.8)
RBC # FLD: 15.1 % — HIGH (ref 10.3–14.5)
SODIUM SERPL-SCNC: 137 MMOL/L — SIGNIFICANT CHANGE UP (ref 135–145)
WBC # BLD: 8.09 K/UL — SIGNIFICANT CHANGE UP (ref 3.8–10.5)
WBC # FLD AUTO: 8.09 K/UL — SIGNIFICANT CHANGE UP (ref 3.8–10.5)

## 2020-05-13 PROCEDURE — 99223 1ST HOSP IP/OBS HIGH 75: CPT | Mod: AI

## 2020-05-13 PROCEDURE — 99497 ADVNCD CARE PLAN 30 MIN: CPT

## 2020-05-13 PROCEDURE — 99232 SBSQ HOSP IP/OBS MODERATE 35: CPT

## 2020-05-13 RX ORDER — SODIUM CHLORIDE 9 MG/ML
1000 INJECTION, SOLUTION INTRAVENOUS
Refills: 0 | Status: DISCONTINUED | OUTPATIENT
Start: 2020-05-13 | End: 2020-05-13

## 2020-05-13 RX ORDER — ENOXAPARIN SODIUM 100 MG/ML
40 INJECTION SUBCUTANEOUS DAILY
Refills: 0 | Status: DISCONTINUED | OUTPATIENT
Start: 2020-05-13 | End: 2020-05-15

## 2020-05-13 RX ORDER — ACETAMINOPHEN 500 MG
650 TABLET ORAL EVERY 4 HOURS
Refills: 0 | Status: DISCONTINUED | OUTPATIENT
Start: 2020-05-13 | End: 2020-05-15

## 2020-05-13 RX ORDER — CEFTRIAXONE 500 MG/1
1000 INJECTION, POWDER, FOR SOLUTION INTRAMUSCULAR; INTRAVENOUS EVERY 24 HOURS
Refills: 0 | Status: DISCONTINUED | OUTPATIENT
Start: 2020-05-13 | End: 2020-05-15

## 2020-05-13 RX ORDER — ONDANSETRON 8 MG/1
4 TABLET, FILM COATED ORAL EVERY 6 HOURS
Refills: 0 | Status: DISCONTINUED | OUTPATIENT
Start: 2020-05-13 | End: 2020-05-15

## 2020-05-13 RX ADMIN — ENOXAPARIN SODIUM 40 MILLIGRAM(S): 100 INJECTION SUBCUTANEOUS at 11:08

## 2020-05-13 RX ADMIN — SODIUM CHLORIDE 2500 MILLILITER(S): 9 INJECTION INTRAMUSCULAR; INTRAVENOUS; SUBCUTANEOUS at 00:00

## 2020-05-13 RX ADMIN — Medication 5 MILLIGRAM(S): at 11:08

## 2020-05-13 RX ADMIN — SODIUM CHLORIDE 80 MILLILITER(S): 9 INJECTION, SOLUTION INTRAVENOUS at 01:32

## 2020-05-13 RX ADMIN — CEFTRIAXONE 1000 MILLIGRAM(S): 500 INJECTION, POWDER, FOR SOLUTION INTRAMUSCULAR; INTRAVENOUS at 21:41

## 2020-05-13 RX ADMIN — SODIUM CHLORIDE 80 MILLILITER(S): 9 INJECTION, SOLUTION INTRAVENOUS at 14:38

## 2020-05-13 NOTE — PROGRESS NOTE ADULT - ASSESSMENT
80 yo male admitted for acute UTI complicated by sepsis    #Sepsis secondary to UTI:    Failed outpatient tx with bactrim.    Sx improved on IV ABX-- rocephin day #2.    F/u urine culture/ blood cx.    Repeat lactate.    Stop further IVF.      #Hyperglycemia:    A1C 5.3-- no DM.      #Hypokalemia:    Resolved.      #LAURE:    Related to ATN/ sepsis.    Resolved with IVF.      #Arthritis  -cont prednisone    #DVT Proph:  Lovenox.

## 2020-05-13 NOTE — PROGRESS NOTE ADULT - SUBJECTIVE AND OBJECTIVE BOX
80 y/o male with a pmh/o arthritis for which he takes prednisone daily, who presents to ED after treatment on bactrim for approx one week due to increasingly worsening urinary hesitency, burning, frequency. Pt states he had gone to urgent care and was placed on pyrimidine and bactrim but continued to feel worse and endorses subjective fevers and chills at home.  Admitted for Urosepsis.      :  Pt seen.  Feeling much better.  No further dysuria.      Review of system- All 10 systems reviewed and is as per HPI otherwise negative.     T(C): 36.5 (20 @ 16:04), Max: 38.5 (20 @ 21:15)  HR: 97 (20 @ 16:04) (97 - 135)  BP: 126/60 (20 @ 16:04) (92/47 - 137/69)  RR: 18 (20 @ 16:04) (18 - 22)  SpO2: 95% (20 @ 16:04) (89% - 100%)  Wt(kg): --    PHYSICAL EXAM:  GENERAL: Comfortable, no acute distress  HEAD:  Atraumatic, Normocephalic  EYES: EOMI, PERRLA  HEENT: Moist mucous membranes  NECK: Supple, No JVD  NERVOUS SYSTEM:  Alert & Oriented X3, Motor Strength 5/5 B/L upper and lower extremities  CHEST/LUNG: Clear to auscultation bilaterally  HEART: Regular rate and rhythm; No murmurs, rubs, or gallops  ABDOMEN: Soft, Nontender, Nondistended; Bowel sounds present  GENITOURINARY- Voiding, no palpable bladder  EXTREMITIES:  No clubbing, cyanosis, or edema  MUSCULOSKELTAL- No muscle tenderness, No joint tenderness  SKIN-no rash        LABS:                        9.4    8.09  )-----------( 231      ( 13 May 2020 03:41 )             28.1         137  |  109<H>  |  19  ----------------------------<  118<H>  4.4   |  21<L>  |  1.19    Ca    7.7<L>      13 May 2020 03:41  Phos  2.3     05-13  Mg     1.7         TPro  6.2  /  Alb  3.0<L>  /  TBili  1.0  /  DBili  x   /  AST  24  /  ALT  38  /  AlkPhos  43  -    PT/INR - ( 12 May 2020 21:12 )   PT: 13.4 sec;   INR: 1.20 ratio         PTT - ( 12 May 2020 21:12 )  PTT:20.2 sec  Urinalysis Basic - ( 12 May 2020 21:39 )    Color: Yellow / Appearance: Slightly Turbid / S.025 / pH: x  Gluc: x / Ketone: Trace  / Bili: Small / Urobili: Negative mg/dL   Blood: x / Protein: 30 mg/dL / Nitrite: Positive   Leuk Esterase: Moderate / RBC: 0-2 /HPF / WBC >50   Sq Epi: x / Non Sq Epi: Negative / Bacteria: Many    MEDS  acetaminophen   Tablet .. 650 milliGRAM(s) Oral every 4 hours PRN  cefTRIAXone Injectable. 1000 milliGRAM(s) IV Push every 24 hours  enoxaparin Injectable 40 milliGRAM(s) SubCutaneous daily  lactated ringers. 1000 milliLiter(s) IV Continuous <Continuous>  ondansetron Injectable 4 milliGRAM(s) IV Push every 6 hours PRN  predniSONE   Tablet 5 milliGRAM(s) Oral daily

## 2020-05-13 NOTE — H&P ADULT - ASSESSMENT
80 yo male admitted for acute UTI complicated by sepsis    admit to [facility] surg  Blood culture  U/A and culture  Chest x ray noted, no s/s pna on exam, covid negative  monitor lactate-> if >2 give additional bolus and f/u rpt s/p IVF  administer 30cc/kg bolus in increments of 500cc with evaluation of fluid status prior to subsequent bolus administration  monitor vitals closely  strict i/o's, consider gunter placement in critically ill  Broad spectrum IV abx, rocephin  CODE STATUS: full code    Hyperglycemia  -denies h/o pre/DMII  -f/u HbA1c  -monitor serum glucose and consider HISS carb restriction pending trend/result    Hypokalemia  -repleted in ED  -f/u AM chemistry     Acute renal insufficiency  -denies h/o renal issues  -baseline Cr unknown  -Strict i/o's  -hold nephrotoxic medications  -IVF for gentle hydration with close monitoring for s/s fluid overload    Arthritis  -cont prednisone

## 2020-05-13 NOTE — H&P ADULT - ATTENDING COMMENTS
18 min spend discussing advanced care planning and pt is full code at this time. Pt accepts cpr/intubation if medically indicated.

## 2020-05-13 NOTE — H&P ADULT - HISTORY OF PRESENT ILLNESS
Pt is an 80 yo male with a pmh/o arthritis for which he takes prednisone daily, who presents to ED after treatment on bactrim for approx one week due to increasingly worsening urinary hesitency, burning, frequency. Pt states he had gone to urgent care and was placed on pyrimidine and bactrim but continued to feel worse and endorses subjective fevers and chills at home.     Pt denies cp, palpitations, n/v/d, constipation, HA, abd pain, flank pain, back pain, h/o kidney stones, hematuria.

## 2020-05-13 NOTE — H&P ADULT - NSICDXFAMILYHX_GEN_ALL_CORE_FT
FAMILY HISTORY:  Patient unable to provide medical history, of parents,  , does not know cause of death

## 2020-05-14 LAB
ANION GAP SERPL CALC-SCNC: 4 MMOL/L — LOW (ref 5–17)
BUN SERPL-MCNC: 11 MG/DL — SIGNIFICANT CHANGE UP (ref 7–23)
CALCIUM SERPL-MCNC: 8.5 MG/DL — SIGNIFICANT CHANGE UP (ref 8.5–10.1)
CHLORIDE SERPL-SCNC: 110 MMOL/L — HIGH (ref 96–108)
CO2 SERPL-SCNC: 27 MMOL/L — SIGNIFICANT CHANGE UP (ref 22–31)
CREAT SERPL-MCNC: 0.7 MG/DL — SIGNIFICANT CHANGE UP (ref 0.5–1.3)
GLUCOSE SERPL-MCNC: 99 MG/DL — SIGNIFICANT CHANGE UP (ref 70–99)
LACTATE SERPL-SCNC: 0.8 MMOL/L — SIGNIFICANT CHANGE UP (ref 0.7–2)
POTASSIUM SERPL-MCNC: 3.7 MMOL/L — SIGNIFICANT CHANGE UP (ref 3.5–5.3)
POTASSIUM SERPL-SCNC: 3.7 MMOL/L — SIGNIFICANT CHANGE UP (ref 3.5–5.3)
SODIUM SERPL-SCNC: 141 MMOL/L — SIGNIFICANT CHANGE UP (ref 135–145)

## 2020-05-14 PROCEDURE — 99232 SBSQ HOSP IP/OBS MODERATE 35: CPT

## 2020-05-14 RX ORDER — PANTOPRAZOLE SODIUM 20 MG/1
40 TABLET, DELAYED RELEASE ORAL
Refills: 0 | Status: DISCONTINUED | OUTPATIENT
Start: 2020-05-14 | End: 2020-05-15

## 2020-05-14 RX ADMIN — ENOXAPARIN SODIUM 40 MILLIGRAM(S): 100 INJECTION SUBCUTANEOUS at 09:58

## 2020-05-14 RX ADMIN — Medication 5 MILLIGRAM(S): at 09:58

## 2020-05-14 RX ADMIN — PANTOPRAZOLE SODIUM 40 MILLIGRAM(S): 20 TABLET, DELAYED RELEASE ORAL at 16:19

## 2020-05-14 RX ADMIN — CEFTRIAXONE 1000 MILLIGRAM(S): 500 INJECTION, POWDER, FOR SOLUTION INTRAMUSCULAR; INTRAVENOUS at 21:10

## 2020-05-14 NOTE — PROGRESS NOTE ADULT - ASSESSMENT
78 yo male admitted for acute UTI complicated by sepsis.      #Sepsis secondary to UTI:    Failed outpatient tx with bactrim.    Sx improved on IV ABX-- rocephin day #2.    F/u urine culture/ blood cx.  Urine cx with >100,000 GNR.    Repeat lactate.    Stop further IVF.      #Anemia:    Hgb 9-10.  Suspect chronic.  Hgb 10 in Jan 2019.    Check iron/ b12/ folate.  Elevate MCV.      #Hyperglycemia:    A1C 5.3-- no DM.      #Hypokalemia:    Resolved.      #LAURE:    Related to ATN/ sepsis.    Resolved with IVF.      #Arthritis  -cont prednisone  Start protonix with chronic steroids.      #DVT Proph:  Lovenox.      DISPO:  likely d/c pending urine cx results.

## 2020-05-14 NOTE — PROGRESS NOTE ADULT - SUBJECTIVE AND OBJECTIVE BOX
80 y/o male with a pmh/o arthritis for which he takes prednisone daily, who presents to ED after treatment on bactrim for approx one week due to increasingly worsening urinary hesitency, burning, frequency. Pt states he had gone to urgent care and was placed on pyrimidine and bactrim but continued to feel worse and endorses subjective fevers and chills at home.  Admitted for Urosepsis.      :  Pt seen.  Feeling much better.  No further dysuria.    :  Pt seen.  Minimal further dysuria-- almost gone.  No fever.      Review of system- All 10 systems reviewed and is as per HPI otherwise negative.     Vital Signs Last 24 Hrs  T(C): 36.5 (14 May 2020 07:32), Max: 36.6 (13 May 2020 23:11)  T(F): 97.7 (14 May 2020 07:32), Max: 97.9 (13 May 2020 23:11)  HR: 84 (14 May 2020 07:32) (79 - 97)  BP: 125/64 (14 May 2020 07:32) (91/39 - 126/60)  BP(mean): --  RR: 16 (14 May 2020 07:32) (16 - 18)  SpO2: 94% (14 May 2020 07:32) (94% - 97%)    PHYSICAL EXAM:  GENERAL: Comfortable, no acute distress  HEAD:  Atraumatic, Normocephalic  EYES: EOMI, PERRLA  HEENT: Moist mucous membranes  NECK: Supple, No JVD  NERVOUS SYSTEM:  Alert & Oriented X3, Motor Strength 5/5 B/L upper and lower extremities  CHEST/LUNG: Clear to auscultation bilaterally  HEART: Regular rate and rhythm; No murmurs, rubs, or gallops  ABDOMEN: Soft, Nontender, Nondistended; Bowel sounds present  GENITOURINARY- Voiding, no palpable bladder  EXTREMITIES:  No clubbing, cyanosis, or edema  MUSCULOSKELTAL- No muscle tenderness, No joint tenderness  SKIN-no rash    LABS:      141  |  110<H>  |  11  ----------------------------<  99  3.7   |  27  |  0.70    Ca    8.5      14 May 2020 07:10  Phos  2.3     05-13  Mg     1.7     05-13    TPro  6.2  /  Alb  3.0<L>  /  TBili  1.0  /  DBili  x   /  AST  24  /  ALT  38  /  AlkPhos  43                              9.4    8.09  )-----------( 231      ( 13 May 2020 03:41 )             28.1     LIVER FUNCTIONS - ( 13 May 2020 03:41 )  Alb: 3.0 g/dL / Pro: 6.2 gm/dL / ALK PHOS: 43 U/L / ALT: 38 U/L / AST: 24 U/L / GGT: x           Urinalysis Basic - ( 12 May 2020 21:39 )  Color: Yellow / Appearance: Slightly Turbid / S.025 / pH: x  Gluc: x / Ketone: Trace  / Bili: Small / Urobili: Negative mg/dL   Blood: x / Protein: 30 mg/dL / Nitrite: Positive   Leuk Esterase: Moderate / RBC: 0-2 /HPF / WBC >50   Sq Epi: x / Non Sq Epi: Negative / Bacteria: Many    Culture - Urine (20 @ 21:39)    Specimen Source: .Urine None    Culture Results:   >100,000 CFU/ml Gram Negative Rods          MEDICATIONS  (STANDING):  cefTRIAXone Injectable. 1000 milliGRAM(s) IV Push every 24 hours  enoxaparin Injectable 40 milliGRAM(s) SubCutaneous daily  pantoprazole    Tablet 40 milliGRAM(s) Oral before breakfast  predniSONE   Tablet 5 milliGRAM(s) Oral daily    MEDICATIONS  (PRN):  acetaminophen   Tablet .. 650 milliGRAM(s) Oral every 4 hours PRN Temp greater or equal to 38C (100.4F), Mild Pain (1 - 3)  ondansetron Injectable 4 milliGRAM(s) IV Push every 6 hours PRN Nausea

## 2020-05-15 ENCOUNTER — TRANSCRIPTION ENCOUNTER (OUTPATIENT)
Age: 80
End: 2020-05-15

## 2020-05-15 VITALS
SYSTOLIC BLOOD PRESSURE: 123 MMHG | RESPIRATION RATE: 17 BRPM | OXYGEN SATURATION: 98 % | TEMPERATURE: 98 F | DIASTOLIC BLOOD PRESSURE: 62 MMHG | HEART RATE: 70 BPM

## 2020-05-15 LAB
-  AMIKACIN: SIGNIFICANT CHANGE UP
-  AMPICILLIN/SULBACTAM: SIGNIFICANT CHANGE UP
-  AMPICILLIN: SIGNIFICANT CHANGE UP
-  AZTREONAM: SIGNIFICANT CHANGE UP
-  CEFAZOLIN: SIGNIFICANT CHANGE UP
-  CEFEPIME: SIGNIFICANT CHANGE UP
-  CEFOXITIN: SIGNIFICANT CHANGE UP
-  CEFTRIAXONE: SIGNIFICANT CHANGE UP
-  CIPROFLOXACIN: SIGNIFICANT CHANGE UP
-  GENTAMICIN: SIGNIFICANT CHANGE UP
-  IMIPENEM: SIGNIFICANT CHANGE UP
-  LEVOFLOXACIN: SIGNIFICANT CHANGE UP
-  MEROPENEM: SIGNIFICANT CHANGE UP
-  NITROFURANTOIN: SIGNIFICANT CHANGE UP
-  PIPERACILLIN/TAZOBACTAM: SIGNIFICANT CHANGE UP
-  TIGECYCLINE: SIGNIFICANT CHANGE UP
-  TOBRAMYCIN: SIGNIFICANT CHANGE UP
-  TRIMETHOPRIM/SULFAMETHOXAZOLE: SIGNIFICANT CHANGE UP
CULTURE RESULTS: SIGNIFICANT CHANGE UP
FERRITIN SERPL-MCNC: 1168 NG/ML — HIGH (ref 30–400)
FOLATE SERPL-MCNC: 13 NG/ML — SIGNIFICANT CHANGE UP
IRON SATN MFR SERPL: 230 UG/DL — HIGH (ref 45–165)
IRON SATN MFR SERPL: 90 % — HIGH (ref 16–55)
METHOD TYPE: SIGNIFICANT CHANGE UP
ORGANISM # SPEC MICROSCOPIC CNT: SIGNIFICANT CHANGE UP
ORGANISM # SPEC MICROSCOPIC CNT: SIGNIFICANT CHANGE UP
SPECIMEN SOURCE: SIGNIFICANT CHANGE UP
TIBC SERPL-MCNC: 257 UG/DL — SIGNIFICANT CHANGE UP (ref 220–430)
UIBC SERPL-MCNC: 26 UG/DL — LOW (ref 110–370)
VIT B12 SERPL-MCNC: 636 PG/ML — SIGNIFICANT CHANGE UP (ref 232–1245)

## 2020-05-15 PROCEDURE — 99239 HOSP IP/OBS DSCHRG MGMT >30: CPT

## 2020-05-15 RX ORDER — PANTOPRAZOLE SODIUM 20 MG/1
1 TABLET, DELAYED RELEASE ORAL
Qty: 30 | Refills: 0
Start: 2020-05-15

## 2020-05-15 RX ORDER — CEFUROXIME AXETIL 250 MG
1 TABLET ORAL
Qty: 14 | Refills: 0
Start: 2020-05-15

## 2020-05-15 RX ADMIN — PANTOPRAZOLE SODIUM 40 MILLIGRAM(S): 20 TABLET, DELAYED RELEASE ORAL at 06:28

## 2020-05-15 RX ADMIN — ENOXAPARIN SODIUM 40 MILLIGRAM(S): 100 INJECTION SUBCUTANEOUS at 09:11

## 2020-05-15 RX ADMIN — Medication 5 MILLIGRAM(S): at 09:11

## 2020-05-15 NOTE — DISCHARGE NOTE PROVIDER - NSDCMRMEDTOKEN_GEN_ALL_CORE_FT
cefuroxime 500 mg oral tablet: 1 tab(s) orally 2 times a day   pantoprazole 40 mg oral delayed release tablet: 1 tab(s) orally once a day (before a meal)  predniSONE 5 mg oral tablet: 1 tab(s) orally 2 times a day

## 2020-05-15 NOTE — DISCHARGE NOTE PROVIDER - CARE PROVIDER_API CALL
Lissette Ramirez  [address] [address]  Phone: [phone]  Fax: [phone]  Follow Up Time:

## 2020-05-15 NOTE — DISCHARGE NOTE NURSING/CASE MANAGEMENT/SOCIAL WORK - PATIENT PORTAL LINK FT
You can access the FollowMyHealth Patient Portal offered by [facility] by registering at the following website: [email or web address]. By joining [employer]s FollowMyHealth portal, you will also be able to view your health information using other applications (apps) compatible with our system.

## 2020-05-15 NOTE — DISCHARGE NOTE PROVIDER - HOSPITAL COURSE
pmd - dr monsivais        78 yo male admitted for acute UTI complicated by sepsis.          #Sepsis secondary to UTI:      Failed outpatient tx with bactrim.      Sx improved on IV ABX-- rocephin day #3 and ucx with e.coli - pan sens. willd con po ceftin for 7 more days              #Anemia:      Hgb 9-10.  Suspect chronic.  Hgb 10 in Jan 2019.      Check iron/ b12/ folate.  Elevate MCV.  (nl b12 folate)    - pt aware to f/u with MD for further workup        #Hyperglycemia:      A1C 5.3-- no DM.          #Hypokalemia:      Resolved.          #LAURE:      Related to ATN/ sepsis.      Resolved with IVF.          #Arthritis    -cont prednisone    Start protonix while on chronic steroids        dc home time spent 35mins

## 2020-05-15 NOTE — DISCHARGE NOTE PROVIDER - CARE PROVIDERS DIRECT ADDRESSES
[email or web address]

## 2020-05-15 NOTE — DISCHARGE NOTE PROVIDER - NSDCPNSUBOBJ_GEN_ALL_CORE
80 yo male admitted for acute UTI complicated by sepsis.          Vital Signs Last 24 Hrs    T(C): 36.6 (15 May 2020 07:23), Max: 36.8 (14 May 2020 17:01)    T(F): 97.8 (15 May 2020 07:23), Max: 98.2 (14 May 2020 17:01)    HR: 70 (15 May 2020 07:23) (70 - 85)    BP: 123/62 (15 May 2020 07:23) (123/62 - 133/71)    BP(mean): --    RR: 17 (15 May 2020 07:23) (16 - 17)    SpO2: 98% (15 May 2020 07:23) (97% - 98%)        GEN: lying in bed, NAD    HEENT:   NC/AT, pupils equal and reactive, EOMI    CV:  +S1, +S2, RRR    RESP:   lungs clear to auscultation bilaterally, no wheeze, rales, rhonchi     BREAST:  not examined    GI:  abdomen soft, non-tender, non-distended, normoactive BS    RECTAL:  not examined    :  not examined    MSK:   normal muscle tone    EXT:  no edema    NEURO:  AAOX3, no focal neurological deficits    SKIN:  no rashes            #Sepsis secondary to UTI:      Failed outpatient tx with bactrim.      Sx improved on IV ABX-- rocephin day #3 and ucx with e.coli - pan sens. willd con po ceftin for 7 more days              #Anemia:      Hgb 9-10.  Suspect chronic.  Hgb 10 in Jan 2019.      Check iron/ b12/ folate.  Elevate MCV.  (nl b12 folate)    - pt aware to f/u with MD for further workup        #Hyperglycemia:      A1C 5.3-- no DM.          #Hypokalemia:      Resolved.          #LAURE:      Related to ATN/ sepsis.      Resolved with IVF.          #Arthritis    -cont prednisone    Start protonix with chronic steroids.          dc home     total time spent 35 mins

## 2020-05-15 NOTE — DISCHARGE NOTE PROVIDER - NSDCCPCAREPLAN_GEN_ALL_CORE_FT
PRINCIPAL DISCHARGE DIAGNOSIS  Diagnosis: Sepsis  Assessment and Plan of Treatment:       SECONDARY DISCHARGE DIAGNOSES  Diagnosis: Sepsis  Assessment and Plan of Treatment:

## 2020-05-18 DIAGNOSIS — R73.9 HYPERGLYCEMIA, UNSPECIFIED: ICD-10-CM

## 2020-05-18 DIAGNOSIS — N39.0 URINARY TRACT INFECTION, SITE NOT SPECIFIED: ICD-10-CM

## 2020-05-18 DIAGNOSIS — A41.9 SEPSIS, UNSPECIFIED ORGANISM: ICD-10-CM

## 2020-05-18 DIAGNOSIS — D64.9 ANEMIA, UNSPECIFIED: ICD-10-CM

## 2020-05-18 DIAGNOSIS — N17.0 ACUTE KIDNEY FAILURE WITH TUBULAR NECROSIS: ICD-10-CM

## 2020-05-18 DIAGNOSIS — M19.90 UNSPECIFIED OSTEOARTHRITIS, UNSPECIFIED SITE: ICD-10-CM

## 2020-05-18 DIAGNOSIS — E87.6 HYPOKALEMIA: ICD-10-CM

## 2020-05-18 LAB
CULTURE RESULTS: SIGNIFICANT CHANGE UP
CULTURE RESULTS: SIGNIFICANT CHANGE UP
SPECIMEN SOURCE: SIGNIFICANT CHANGE UP
SPECIMEN SOURCE: SIGNIFICANT CHANGE UP

## 2020-05-19 ENCOUNTER — APPOINTMENT (OUTPATIENT)
Dept: FAMILY MEDICINE | Facility: CLINIC | Age: 80
End: 2020-05-19
Payer: MEDICARE

## 2020-05-19 PROCEDURE — 99213 OFFICE O/P EST LOW 20 MIN: CPT | Mod: 95

## 2020-05-20 NOTE — ASSESSMENT
[FreeTextEntry1] : this visit was with daughter in law natan, patient went to work and missed appointment\par she states he is feeling great and was discharged from [facility] with no issues\par will schedule hospital follow up on thursday.

## 2020-05-20 NOTE — HISTORY OF PRESENT ILLNESS
[Home] : at home, [unfilled] , at the time of the visit. [Other Location: e.g. Home (Enter Location, City,State)___] : at [unfilled]

## 2020-05-21 ENCOUNTER — APPOINTMENT (OUTPATIENT)
Dept: FAMILY MEDICINE | Facility: CLINIC | Age: 80
End: 2020-05-21
Payer: MEDICARE

## 2020-05-21 DIAGNOSIS — M35.3 POLYMYALGIA RHEUMATICA: ICD-10-CM

## 2020-05-21 DIAGNOSIS — Z87.440 PERSONAL HISTORY OF URINARY (TRACT) INFECTIONS: ICD-10-CM

## 2020-05-21 PROCEDURE — 99496 TRANSJ CARE MGMT HIGH F2F 7D: CPT

## 2020-05-21 NOTE — HISTORY OF PRESENT ILLNESS
[Home] : at home, [unfilled] , at the time of the visit. [Other Location: e.g. Home (Enter Location, City,State)___] : at [unfilled] [Verbal consent obtained from patient] : the patient, [unfilled] [Post-hospitalization from ___ Hospital] : Post-hospitalization from [unfilled] Hospital [Admitted on: ___] : The patient was admitted on [unfilled] [Discharged on ___] : discharged on [unfilled] [Discharge Summary] : discharge summary [Pertinent Labs] : pertinent labs [Radiology Findings] : radiology findings [Discharge Med List] : discharge medication list [Med Reconciliation] : medication reconciliation has been completed [Patient Contacted By: ____] : and contacted by [unfilled] [FreeTextEntry2] : UTI, started on bactrim outpatient, failed treatment, to hospital for sepsis and iv antibitoics\par patient improved on iv antibiotics, roephin , discharged on ceftin\par patient feeling much better overall, with daughter in law natan outside at a job\par states sometimes feels slightly irritated while urinaring, but no frequency, no chills, no pressure overall feelis good

## 2020-05-21 NOTE — PHYSICAL EXAM
[Normal] : no acute distress, well nourished, well developed and well-appearing [No Respiratory Distress] : no respiratory distress  [Normal Insight/Judgement] : insight and judgment were intact [de-identified] : looks well

## 2020-05-28 RX ORDER — NITROFURANTOIN MACROCRYSTALS 100 MG/1
100 CAPSULE ORAL
Qty: 14 | Refills: 0 | Status: ACTIVE | COMMUNITY
Start: 2020-05-28 | End: 1900-01-01

## 2020-06-03 ENCOUNTER — NON-APPOINTMENT (OUTPATIENT)
Age: 80
End: 2020-06-03

## 2020-11-03 ENCOUNTER — APPOINTMENT (OUTPATIENT)
Dept: FAMILY MEDICINE | Facility: CLINIC | Age: 80
End: 2020-11-03

## 2020-12-23 PROBLEM — Z87.440 HISTORY OF URINARY TRACT INFECTION: Status: RESOLVED | Noted: 2020-04-28 | Resolved: 2020-12-23

## 2021-04-14 ENCOUNTER — RX RENEWAL (OUTPATIENT)
Age: 81
End: 2021-04-14

## 2021-04-15 RX ORDER — PREDNISONE 5 MG/1
5 TABLET ORAL
Qty: 60 | Refills: 0 | Status: ACTIVE | COMMUNITY
Start: 1900-01-01 | End: 1900-01-01

## 2021-09-29 ENCOUNTER — EMERGENCY (EMERGENCY)
Facility: HOSPITAL | Age: 81
LOS: 0 days | Discharge: HOME | End: 2021-09-29
Attending: STUDENT IN AN ORGANIZED HEALTH CARE EDUCATION/TRAINING PROGRAM
Payer: MEDICARE

## 2021-09-29 VITALS — DIASTOLIC BLOOD PRESSURE: 82 MMHG | HEIGHT: 72 IN | WEIGHT: 199.96 LBS | SYSTOLIC BLOOD PRESSURE: 148 MMHG

## 2021-09-29 VITALS
HEART RATE: 98 BPM | TEMPERATURE: 98 F | OXYGEN SATURATION: 95 % | DIASTOLIC BLOOD PRESSURE: 116 MMHG | RESPIRATION RATE: 18 BRPM | SYSTOLIC BLOOD PRESSURE: 152 MMHG
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
## 2021-09-29 DIAGNOSIS — M54.5 LOW BACK PAIN: ICD-10-CM
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
## 2021-09-29 DIAGNOSIS — I44.4 LEFT ANTERIOR FASCICULAR BLOCK: ICD-10-CM

## 2021-09-29 DIAGNOSIS — S22.009A UNSPECIFIED FRACTURE OF UNSPECIFIED THORACIC VERTEBRA, INITIAL ENCOUNTER FOR CLOSED FRACTURE: ICD-10-CM

## 2021-09-29 DIAGNOSIS — M19.90 UNSPECIFIED OSTEOARTHRITIS, UNSPECIFIED SITE: ICD-10-CM
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
## 2021-09-29 LAB
ALBUMIN SERPL ELPH-MCNC: 4.1 G/DL — SIGNIFICANT CHANGE UP (ref 3.3–5)
ALP SERPL-CCNC: 66 U/L — SIGNIFICANT CHANGE UP (ref 40–120)
ALT FLD-CCNC: 47 U/L — SIGNIFICANT CHANGE UP (ref 12–78)
ANION GAP SERPL CALC-SCNC: 6 MMOL/L — SIGNIFICANT CHANGE UP (ref 5–17)
APTT BLD: 25 SEC — LOW (ref 27.5–35.5)
AST SERPL-CCNC: 29 U/L — SIGNIFICANT CHANGE UP (ref 15–37)
BASOPHILS # BLD AUTO: 0.02 K/UL — SIGNIFICANT CHANGE UP (ref 0–0.2)
BASOPHILS NFR BLD AUTO: 0.4 % — SIGNIFICANT CHANGE UP (ref 0–2)
BILIRUB SERPL-MCNC: 1.6 MG/DL — HIGH (ref 0.2–1.2)
BUN SERPL-MCNC: 10 MG/DL — SIGNIFICANT CHANGE UP (ref 7–23)
CALCIUM SERPL-MCNC: 8.7 MG/DL — SIGNIFICANT CHANGE UP (ref 8.5–10.1)
CHLORIDE SERPL-SCNC: 108 MMOL/L — SIGNIFICANT CHANGE UP (ref 96–108)
CO2 SERPL-SCNC: 25 MMOL/L — SIGNIFICANT CHANGE UP (ref 22–31)
CREAT SERPL-MCNC: 0.75 MG/DL — SIGNIFICANT CHANGE UP (ref 0.5–1.3)
EOSINOPHIL # BLD AUTO: 0 K/UL — SIGNIFICANT CHANGE UP (ref 0–0.5)
EOSINOPHIL NFR BLD AUTO: 0 % — SIGNIFICANT CHANGE UP (ref 0–6)
GLUCOSE SERPL-MCNC: 107 MG/DL — HIGH (ref 70–99)
HCT VFR BLD CALC: 34.7 % — LOW (ref 39–50)
HGB BLD-MCNC: 11.9 G/DL — LOW (ref 13–17)
IMM GRANULOCYTES NFR BLD AUTO: 0.4 % — SIGNIFICANT CHANGE UP (ref 0–1.5)
INR BLD: 1.11 RATIO — SIGNIFICANT CHANGE UP (ref 0.88–1.16)
LYMPHOCYTES # BLD AUTO: 0.88 K/UL — LOW (ref 1–3.3)
LYMPHOCYTES # BLD AUTO: 18.3 % — SIGNIFICANT CHANGE UP (ref 13–44)
MCHC RBC-ENTMCNC: 34.3 GM/DL — SIGNIFICANT CHANGE UP (ref 32–36)
MCHC RBC-ENTMCNC: 38.8 PG — HIGH (ref 27–34)
MCV RBC AUTO: 113 FL — HIGH (ref 80–100)
MONOCYTES # BLD AUTO: 0.6 K/UL — SIGNIFICANT CHANGE UP (ref 0–0.9)
MONOCYTES NFR BLD AUTO: 12.5 % — SIGNIFICANT CHANGE UP (ref 2–14)
NEUTROPHILS # BLD AUTO: 3.29 K/UL — SIGNIFICANT CHANGE UP (ref 1.8–7.4)
NEUTROPHILS NFR BLD AUTO: 68.4 % — SIGNIFICANT CHANGE UP (ref 43–77)
NT-PROBNP SERPL-SCNC: 1013 PG/ML — HIGH (ref 0–450)
PLATELET # BLD AUTO: 200 K/UL — SIGNIFICANT CHANGE UP (ref 150–400)
POTASSIUM SERPL-MCNC: 4 MMOL/L — SIGNIFICANT CHANGE UP (ref 3.5–5.3)
POTASSIUM SERPL-SCNC: 4 MMOL/L — SIGNIFICANT CHANGE UP (ref 3.5–5.3)
PROT SERPL-MCNC: 7.5 GM/DL — SIGNIFICANT CHANGE UP (ref 6–8.3)
PROTHROM AB SERPL-ACNC: 12.8 SEC — SIGNIFICANT CHANGE UP (ref 10.6–13.6)
RBC # BLD: 3.07 M/UL — LOW (ref 4.2–5.8)
RBC # FLD: 15.2 % — HIGH (ref 10.3–14.5)
SODIUM SERPL-SCNC: 139 MMOL/L — SIGNIFICANT CHANGE UP (ref 135–145)
TROPONIN I SERPL-MCNC: <0.015 NG/ML — SIGNIFICANT CHANGE UP (ref 0.01–0.04)
WBC # BLD: 4.81 K/UL — SIGNIFICANT CHANGE UP (ref 3.8–10.5)
WBC # FLD AUTO: 4.81 K/UL — SIGNIFICANT CHANGE UP (ref 3.8–10.5)

## 2021-09-29 PROCEDURE — 36415 COLL VENOUS BLD VENIPUNCTURE: CPT

## 2021-09-29 PROCEDURE — 93010 ELECTROCARDIOGRAM REPORT: CPT | Mod: 76

## 2021-09-29 PROCEDURE — 71250 CT THORAX DX C-: CPT | Mod: 26,MA

## 2021-09-29 PROCEDURE — 86900 BLOOD TYPING SEROLOGIC ABO: CPT

## 2021-09-29 PROCEDURE — 93005 ELECTROCARDIOGRAM TRACING: CPT

## 2021-09-29 PROCEDURE — 85025 COMPLETE CBC W/AUTO DIFF WBC: CPT

## 2021-09-29 PROCEDURE — 83880 ASSAY OF NATRIURETIC PEPTIDE: CPT

## 2021-09-29 PROCEDURE — 70450 CT HEAD/BRAIN W/O DYE: CPT | Mod: 26,MA

## 2021-09-29 PROCEDURE — 74176 CT ABD & PELVIS W/O CONTRAST: CPT | Mod: 26,MA

## 2021-09-29 PROCEDURE — 85730 THROMBOPLASTIN TIME PARTIAL: CPT

## 2021-09-29 PROCEDURE — 99281 EMR DPT VST MAYX REQ PHY/QHP: CPT

## 2021-09-29 PROCEDURE — 72125 CT NECK SPINE W/O DYE: CPT | Mod: 26,MA

## 2021-09-29 PROCEDURE — 71250 CT THORAX DX C-: CPT | Mod: MA

## 2021-09-29 PROCEDURE — U0005: CPT

## 2021-09-29 PROCEDURE — 85610 PROTHROMBIN TIME: CPT

## 2021-09-29 PROCEDURE — 84484 ASSAY OF TROPONIN QUANT: CPT

## 2021-09-29 PROCEDURE — 72125 CT NECK SPINE W/O DYE: CPT | Mod: MA

## 2021-09-29 PROCEDURE — U0003: CPT

## 2021-09-29 PROCEDURE — 99285 EMERGENCY DEPT VISIT HI MDM: CPT

## 2021-09-29 PROCEDURE — 86850 RBC ANTIBODY SCREEN: CPT

## 2021-09-29 PROCEDURE — 99284 EMERGENCY DEPT VISIT MOD MDM: CPT | Mod: 25

## 2021-09-29 PROCEDURE — 80053 COMPREHEN METABOLIC PANEL: CPT

## 2021-09-29 PROCEDURE — 86901 BLOOD TYPING SEROLOGIC RH(D): CPT

## 2021-09-29 PROCEDURE — 74176 CT ABD & PELVIS W/O CONTRAST: CPT | Mod: MA

## 2021-09-29 PROCEDURE — 70450 CT HEAD/BRAIN W/O DYE: CPT | Mod: MA

## 2021-09-29 RX ORDER — ACETAMINOPHEN 500 MG
975 TABLET ORAL ONCE
Refills: 0 | Status: COMPLETED | OUTPATIENT
Start: 2021-09-29 | End: 2021-09-29

## 2021-09-29 RX ADMIN — Medication 975 MILLIGRAM(S): at 13:19

## 2021-09-29 NOTE — ED PROVIDER NOTE - CARE PROVIDER_API CALL
Pierce Sterling)  Orthopaedic Surgery  [address] [place] [address]  Phone: [phone]  Fax: [phone]  Follow Up Time:

## 2021-09-29 NOTE — ED PROVIDER NOTE - CARE PLAN
Principal Discharge DX:	Compression fracture of thoracic vertebra  Assessment and plan of treatment:	Will obtain CBC, BMP, CT head, CT cervical spine, CT abdomen and pelvis.   1

## 2021-09-29 NOTE — ED PROVIDER NOTE - CHPI ED SYMPTOMS NEG
no bladder dysfunction/no bowel dysfunction/no constipation/no neck tenderness/no numbness/no tingling/no difficulty bearing weight

## 2021-09-29 NOTE — ED ADULT NURSE REASSESSMENT NOTE - NS ED NURSE REASSESS COMMENT FT1
patient ambulated with standby RN assist, gait steady, patient has no complaints of pain at this time

## 2021-09-29 NOTE — ED PROVIDER NOTE - NSFOLLOWUPINSTRUCTIONS_ED_ALL_ED_FT
You were cleared by orthopedic spine specialists for discharge. Please follow up with Orthopedic spine Dr. Sterling in 1 week after discharge.     A vertebral compression fracture (VCF) is a collapse or breakdown in a bone in your spine. Compression fractures happen when there is too much pressure on the vertebra. VCFs most often occur in the thoracic (middle) and lumbar (lower) areas of your spine. Fractures may be mild to severe.    Call your local emergency number (911 in the ) if:   •You feel lightheaded, short of breath, and have chest pain.    •You cough up blood.    •You suddenly cannot feel your legs.    •You suddenly have trouble moving your arms or legs.    Seek care immediately if:   •Your arm or leg feels warm, tender, and painful. It may look swollen and red.    •You have new problems urinating or having bowel movements.    •You have severe pain in your back after falling, bending forward, sneezing, or coughing strongly.    Call your doctor or orthopedist if:   •You cannot sleep or rest because of back pain.    •You have pain or swelling in your back that is getting worse, or does not go away.    •You have questions or concerns about your condition or care.    Treatment may include any of the following, depending on how severe the fracture is:   •Bed rest may be needed for a mild fracture.    •A cane or walker can help you keep your balance when you walk. This helps prevent a fall that could cause more injury.    •Medicines may be given for pain. They can decrease the pain of a VCF caused by osteoporosis, and decrease your risk for another fracture.    •Physical or occupational therapy may be recommended. A physical therapist teaches you exercises to help improve movement and strength, and to decrease pain. An occupational therapist teaches you skills to help with your daily activities.    Heat and ice:   •Apply ice on your back for 15 to 20 minutes every hour or as directed. Use an ice pack, or put crushed ice in a plastic bag. Cover it with a towel before you apply it. Ice helps prevent tissue damage and decreases swelling and pain.    •Apply heat on your back for 20 to 30 minutes every 2 hours for as many days as directed. Heat helps decrease pain and muscle spasms.    Activity:   •Avoid activities that may make the pain worse, such as picking up heavy objects. When the pain decreases, begin normal, slow movements as directed by your healthcare provider. Your healthcare provider may have you do weight-bearing exercises such as walking. You may also do non-weight-bearing exercises such as swimming and bicycling.    •You may need to use a walker or cane. Ask your healthcare provider for more information about how to use a cane or a walker.    •When you  objects, bend at the hips and knees. Never bend from the waist only. Use bent knees and your leg muscles as you lift the object. While you lift the object, keep it close to your chest. Try not to twist or lift anything above your waist.    Physical and occupational therapy: Your healthcare provider may recommend you start or continue one or both types of therapy. A physical therapist teaches you exercises to help improve movement and strength, and to decrease pain. An occupational therapist teaches you skills to help with your daily activities.    Manage pain during sleep:   •Do not sleep on a waterbed. Waterbeds do not provide good back support.    •Sleep on a firm mattress. You may also put a ½ to 1-inch piece of plywood between the mattress and box spring.    •Sleep on your back with a pillow under your knees. This will decrease pressure on your back. You may also sleep on your side with 1 or both of your knees bent and a pillow between them. It may also be helpful to sleep on your stomach with a pillow under you at waist level.    Follow up with your orthopedist Dr. Sterling in 1 week. You may need to return for x-rays or other tests. Write down your questions so you remember to ask them during your visits.

## 2021-09-29 NOTE — ED PROVIDER NOTE - ATTENDING CONTRIBUTION TO CARE
I, Tori Bond DO,  performed the initial face to face bedside interview with this patient regarding history of present illness, review of symptoms and relevant past medical, social and family history.  I completed an independent physical examination.  I was the initial provider who evaluated this patient. I have signed out the follow up of any pending tests (i.e. labs, radiological studies) to the ACP.  I have communicated the patient’s plan of care and disposition with the ACP.  The history, relevant review of systems, past medical and surgical history, medical decision making, and physical examination was documented by the scribe in my presence and I attest to the accuracy of the documentation.

## 2021-09-29 NOTE — ED ADULT NURSE NOTE - OBJECTIVE STATEMENT
patient presents to ED c/o lower back pain s/p mechanical fall yesterday. 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 The pit was about 8 feet deep. Pt states that he tumbled down the side and thinks he pulled a muscle in his back. Pain located in mid-lower back, non-radiating, described as a soreness. Pt states he also hit his face/nose and had a mild bloody nose that resolved spontaneously. His 2 sons helped him up and he was able to ambulate after the fall. Denies LOC, numbness/tingling LE, difficulty urinating, fecal/urinary incontinence, saddle anesthesia.

## 2021-09-29 NOTE — ED PROVIDER NOTE - PHYSICAL EXAMINATION
GEN: sitting in bed, NAD  HEENT: NC/AT, pupils equal and reactive, EOMI  CV:  +S1, +S2, RRR, + murmur  RESP:  lungs clear to auscultation bilaterally, no wheeze, rales, rhonchi   BREAST:  not examined  GI: abdomen soft, non-tender, non-distended, normoactive BS  RECTAL: not examined  :  not examined  MSK: normal muscle tone 5/5 strength UE and LE, negative straight leg raise, dec ROM lumbar spine d/t pain.  EXT: no edema  NEURO: AAOX3, no focal neurological deficits  SKIN:  no rashes GEN: sitting in bed, NAD  HEENT: NC/AT, pupils equal and reactive, EOMI  CV:  +S1, +S2, RRR, + murmur  RESP:  lungs clear to auscultation bilaterally, no wheeze, rales, rhonchi   BREAST:  not examined  GI: abdomen soft, non-tender, non-distended, normoactive BS  RECTAL: not examined  :  not examined  MSK: + spinal tenderness lumbar spine, normal muscle tone 5/5 strength UE and LE, negative straight leg raise, dec ROM lumbar spine d/t pain.  EXT: no edema  NEURO: AAOX3, no focal neurological deficits  SKIN:  no rashes

## 2021-09-29 NOTE — CONSULT NOTE ADULT - SUBJECTIVE AND OBJECTIVE BOX
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 He felt pain in his back but was able to get up and ambulate afterwards, No other injury. Negative head strike. Felt immediate pain. Denies pain down legs, denies paresthesias, denies incontinence of bowel or bladder.  xray and CT scan showed Fx of T12.  Pt is refusing MRI imaging. Dr Sterling office was called for consult and we are asked to see the pt on his behalf. Pt seen in the ED w the Orthopedic Team PAs and Residents.    PAST MEDICAL & SURGICAL HISTORY:  Arthritis    No significant past surgical history        FAMILY HISTORY:  FH: HTN (hypertension)        SOCIAL HISTORY:     Vital Signs Last 24 Hrs  T(C): 36.8 (29 Sep 2021 11:16), Max: 36.8 (29 Sep 2021 11:16)  T(F): 98.2 (29 Sep 2021 11:16), Max: 98.2 (29 Sep 2021 11:16)  HR: 98 (29 Sep 2021 11:16) (98 - 98)  BP: 148/82 (29 Sep 2021 11:17) (148/82 - 152/116)  BP(mean): 102 (29 Sep 2021 11:17) (102 - 129)  RR: 18 (29 Sep 2021 11:16) (18 - 18)  SpO2: 95% (29 Sep 2021 11:16) (95% - 95%)  I&O's Detail      LABS:                        11.9   4.81  )-----------( 200      ( 29 Sep 2021 12:55 )             34.7     09-29    139  |  108  |  10  ----------------------------<  107<H>  4.0   |  25  |  0.75    Ca    8.7      29 Sep 2021 12:55    TPro  7.5  /  Alb  4.1  /  TBili  1.6<H>  /  DBili  x   /  AST  29  /  ALT  47  /  AlkPhos  66  09-29    PT/INR - ( 29 Sep 2021 12:55 )   PT: 12.8 sec;   INR: 1.11 ratio         PTT - ( 29 Sep 2021 12:55 )  PTT:25.0 sec      RADIOLOGY & ADDITIONAL STUDIES:  < from: CT Abdomen and Pelvis No Cont (09.29.21 @ 12:27) >  Acute appearing severe compression deformity of T12 with mild retropulsion.    < end of copied text >  < from: CT Cervical Spine No Cont (09.29.21 @ 12:27) >  CT head:  -No acute transcortical infarct or intracranial hemorrhage.  -Chronic left frontal lobe infarct.    CT cervical spine:  -No acute fracture.    < end of copied text >      PHYSICAL EXAM:  General; Awake and alert, Oriented x 3  Hips/Pelvis:   ABle to SLR bilaterally. Negative log roll, heel strike. No pain on passive Int/Ext hip rotation.  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 He felt pain in his back but was able to get up and ambulate afterwards, No other injury. Negative head strike. Felt immediate pain. Denies pain down legs, denies paresthesias, denies incontinence of bowel or bladder.  xray and CT scan showed Fx of T12.  Pt is refusing MRI imaging. Dr Sterling office was called for consult and we are asked to see the pt on his behalf. Pt seen in the ED w the Orthopedic Team PAs and Residents.    PAST MEDICAL & SURGICAL HISTORY:  Arthritis    No significant past surgical history        FAMILY HISTORY:  FH: HTN (hypertension)        SOCIAL HISTORY:     Vital Signs Last 24 Hrs  T(C): 36.8 (29 Sep 2021 11:16), Max: 36.8 (29 Sep 2021 11:16)  T(F): 98.2 (29 Sep 2021 11:16), Max: 98.2 (29 Sep 2021 11:16)  HR: 98 (29 Sep 2021 11:16) (98 - 98)  BP: 148/82 (29 Sep 2021 11:17) (148/82 - 152/116)  BP(mean): 102 (29 Sep 2021 11:17) (102 - 129)  RR: 18 (29 Sep 2021 11:16) (18 - 18)  SpO2: 95% (29 Sep 2021 11:16) (95% - 95%)  I&O's Detail      LABS:                        11.9   4.81  )-----------( 200      ( 29 Sep 2021 12:55 )             34.7     09-29    139  |  108  |  10  ----------------------------<  107<H>  4.0   |  25  |  0.75    Ca    8.7      29 Sep 2021 12:55    TPro  7.5  /  Alb  4.1  /  TBili  1.6<H>  /  DBili  x   /  AST  29  /  ALT  47  /  AlkPhos  66  09-29    PT/INR - ( 29 Sep 2021 12:55 )   PT: 12.8 sec;   INR: 1.11 ratio         PTT - ( 29 Sep 2021 12:55 )  PTT:25.0 sec      RADIOLOGY & ADDITIONAL STUDIES:  < from: CT Abdomen and Pelvis No Cont (09.29.21 @ 12:27) >  Acute appearing severe compression deformity of T12 with mild retropulsion.    < end of copied text >  < from: CT Cervical Spine No Cont (09.29.21 @ 12:27) >  CT head:  -No acute transcortical infarct or intracranial hemorrhage.  -Chronic left frontal lobe infarct.    CT cervical spine:  -No acute fracture.    < end of copied text >      PHYSICAL EXAM:  Physical Exam:  General: NAD, Alert, Awake and oriented  Neurologic: No gross deficits, moving all 4s.  Head: NCAT without abrasions, lacerations, or ecchymosis to head, face, or scalp  Eyes: PERRL  Neck/C-Spine: No bony TTP, no palpable step off.   T/L Spine: Mild Midline TTP near t12 no palpable step-off.  Respiratory: Nonlabored. Symmetric chest wall expansion bilaterally, no accessory muscle use  Abdomen: Soft, Nontender, no guarding.  Musculoskeletal:      RIGHT UE: No open skin. No obvious deformities or other signs of trauma at shoulder, upper arm, elbow, forearm, wrist or hand/digits.  Full baseline painless ROM at shoulder, elbow, wrist, and . No bony TTP. SILT in axillary, radial, median, and ulnar distributions. + radial pulse palpable with brisk cap refill at distal finger tips. Compartments soft and compressible of upper arm and forearm.  Strength 5/5.    LEFT UE: No open skin. No obvious deformities or other signs of trauma at shoulder, upper arm, elbow, forearm, wrist or hand/digits.  Full baseline painless ROM at shoulder, elbow, wrist, and . No bony TTP. SILT in axillary, radial, median, and ulnar distributions. +radial pulse palpable with brisk cap refill at distal finger tips. Compartments soft and compressible of upper arm and forearm.  Strength 5/5.    HIPS and PELVIS: Pelvis stable to AP and Lat compression. Able to actively SLR bilaterally. Negative Log Roll, Negative Heel strike bilaterally. No pain on internal/external rotation of the hips.    RIGHT LE: No open skin. No deformities or other signs of trauma at hip, thigh, knee, leg, ankle or foot/toes. Extensor mechanism intact. No palpable defect of of patella or quad tendon. Full baseline painless ROM at hip, knee, ankle and toes with negative log-roll and heel strike. Able to actively SLR. SILT toes 1-5. No bony TTP to hip, knee or ankle. + DP/PT pulses palpable with brisk cap refill distally. Compartments soft and compressible of thigh and lower leg.  Strength 5/5. Plantar dorsiflexion 5/5. No ankle instability. No pain on Axial loading of leg.    LEFT LE: No open skin. No deformities  or other signs of trauma at hip, thigh, knee, leg, ankle or foot/toes.  Extensor mechanism intact. No palpable defect of of patella or quad tendon. Full baseline painless ROM at hip, knee, ankle and toe with negative log-roll and heel strike. Able to actively SLR. SILT toes 1-5. No bony TTP to hip, knee or ankle. + DP/PT pulses palpable with brisk cap refill distally. Compartments soft and compressible of thigh and lower leg. Strength 5/5. Plantar dorsiflexion 5/5. No ankle instability. No pain on Axial loading of leg.  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 He felt pain in his back but was able to get up and ambulate afterwards, No other injury. Negative head strike. Felt immediate pain. Denies pain down legs, denies paresthesias, denies incontinence of bowel or bladder.  xray and CT scan showed Fx of T12.  Pt is refusing MRI imaging. Dr Sterling office was called for consult and we are asked to see the pt on his behalf. Pt seen in the ED w the Orthopedic Team PAs and Residents.    PAST MEDICAL & SURGICAL HISTORY:  Arthritis    No significant past surgical history        FAMILY HISTORY:  FH: HTN (hypertension)        SOCIAL HISTORY:     Vital Signs Last 24 Hrs  T(C): 36.8 (29 Sep 2021 11:16), Max: 36.8 (29 Sep 2021 11:16)  T(F): 98.2 (29 Sep 2021 11:16), Max: 98.2 (29 Sep 2021 11:16)  HR: 98 (29 Sep 2021 11:16) (98 - 98)  BP: 148/82 (29 Sep 2021 11:17) (148/82 - 152/116)  BP(mean): 102 (29 Sep 2021 11:17) (102 - 129)  RR: 18 (29 Sep 2021 11:16) (18 - 18)  SpO2: 95% (29 Sep 2021 11:16) (95% - 95%)  I&O's Detail      LABS:                        11.9   4.81  )-----------( 200      ( 29 Sep 2021 12:55 )             34.7     09-29    139  |  108  |  10  ----------------------------<  107<H>  4.0   |  25  |  0.75    Ca    8.7      29 Sep 2021 12:55    TPro  7.5  /  Alb  4.1  /  TBili  1.6<H>  /  DBili  x   /  AST  29  /  ALT  47  /  AlkPhos  66  09-29    PT/INR - ( 29 Sep 2021 12:55 )   PT: 12.8 sec;   INR: 1.11 ratio         PTT - ( 29 Sep 2021 12:55 )  PTT:25.0 sec      RADIOLOGY & ADDITIONAL STUDIES:  < from: CT Abdomen and Pelvis No Cont (09.29.21 @ 12:27) >  Acute appearing severe compression deformity of T12 with mild retropulsion.    < end of copied text >  < from: CT Cervical Spine No Cont (09.29.21 @ 12:27) >  CT head:  -No acute transcortical infarct or intracranial hemorrhage.  -Chronic left frontal lobe infarct.    CT cervical spine:  -No acute fracture.    < end of copied text >      PHYSICAL EXAM:  Physical Exam:  General: NAD, Alert, Awake and oriented  Neurologic: No gross deficits, moving all 4s.  Head: NCAT without abrasions, lacerations, or ecchymosis to head, face, or scalp  Eyes: PERRL  Neck/C-Spine: No bony TTP, no palpable step off.   T/L Spine: Mild Midline TTP near t12 no palpable step-off.  Respiratory: Nonlabored. Symmetric chest wall expansion bilaterally, no accessory muscle use  Abdomen: Soft, Nontender, no guarding.  Musculoskeletal:      RIGHT UE: No open skin. No obvious deformities or other signs of trauma at shoulder, upper arm, elbow, forearm, wrist or hand/digits.  Full baseline painless ROM at shoulder, elbow, wrist, and . No bony TTP. + radial pulse palpable with brisk cap refill at distal finger tips. Compartments soft and compressible of upper arm and forearm.  Strength 5/5.    LEFT UE: No open skin. No obvious deformities or other signs of trauma at shoulder, upper arm, elbow, forearm, wrist or hand/digits.  Full baseline painless ROM at shoulder, elbow, wrist, and . No bony TTP. +radial pulse palpable with brisk cap refill at distal finger tips. Compartments soft and compressible of upper arm and forearm.  Strength 5/5.    HIPS and PELVIS: Pelvis stable to AP and Lat compression. Able to actively SLR bilaterally. Negative Log Roll, Negative Heel strike bilaterally. No pain on internal/external rotation of the hips.    RIGHT LE: No open skin. No deformities or other signs of trauma at hip, thigh, knee, leg, ankle or foot/toes. Extensor mechanism intact. No palpable defect of patella or quad tendon. Full baseline painless ROM at hip, knee, ankle and toes with negative log-roll and heel strike. Able to actively SLR. SILT toes 1-5. No bony TTP to hip, knee or ankle. + DP/PT pulses palpable with brisk cap refill distally. Compartments soft and compressible of thigh and lower leg.  Strength 5/5. Plantar dorsiflexion 5/5. No ankle instability. No pain on Axial loading of leg.    LEFT LE: No open skin. No deformities  or other signs of trauma at hip, thigh, knee, leg, ankle or foot/toes.  Extensor mechanism intact. No palpable defect of patella or quad tendon. Full baseline painless ROM at hip, knee, ankle and toe with negative log-roll and heel strike. Able to actively SLR. SILT toes 1-5. No bony TTP to hip, knee or ankle. + DP/PT pulses palpable with brisk cap refill distally. Compartments soft and compressible of thigh and lower leg. Strength 5/5. Plantar dorsiflexion 5/5. No ankle instability. No pain on Axial loading of leg.    Spine exam:             Sensation:  intact to light touch           Motor exam:          Bilateral Upper extremities: Delt, Biceps, Triceps, Wrist ext, Wrst Flex,  Symmetric 5/5         Bilateral Lower ext: IP, Quadriceps, Hamstring, EHL, FHL, TA, GS Symmetric 5/5

## 2021-09-29 NOTE — CONSULT NOTE ADULT - ASSESSMENT
80 M with acute T12 compression fracture, no gross or clinical neurological compromise of the bilateral LE on exam.  -WIll discuss further with DR Guthrie  -See addendum to follow 80 M with acute T12 compression fracture, no gross or clinical neurological compromise of the bilateral LE on exam.  -WIll discuss further with DR Guthrie  -Pt with stable neuro exam and refusing MRI  -Minimal back pain, able to ambulate  -ortho stable for DC  -pain control  -WBAT  -no heavy lifting  -FU with Dr Guthrie in office this week to follow for fx healing and possible brace

## 2021-09-29 NOTE — ED ADULT NURSE REASSESSMENT NOTE - NS ED NURSE REASSESS COMMENT FT1
patient refusing MRI at this time, reports he "doesn't like them and gets claustrophobic." MD Bond aware

## 2021-09-29 NOTE — ED PROVIDER NOTE - OBJECTIVE STATEMENT
79yo male with PMHx arthritis presents to ED c/o lower back pain s/p mechanical fall yesterday. 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 The pit was about 8 feet deep. Pt states that he tumbled down the side and thinks he pulled a muscle in his back. Pain located in mid-lower back, non-radiating, described as a soreness. Pt states he also hit his face/nose and had a mild bloody nose that resolved spontaneously. His 2 sons helped him up and he was able to ambulate after the fall. Denies LOC, numbness/tingling LE, difficulty urinating, fecal/urinary incontinence, saddle anesthesia.

## 2021-09-29 NOTE — ED STATDOCS - PROGRESS NOTE DETAILS
Sunny Olmstead: 79 y/o M with no significant PMHx presents to the ED s/p fall yesterday. Pt tripped on a wire and fell 9 feet downwards into a dry well. (+) head strike. (+) Hit nose. States he fell around 2 PM yesterday. Today c/o low back pain and mid back pain. Also scraped his fingers with the fall.  No neck pain.  No CP. No abd pain. c/o LE edema. Pt is on Prednisone. Will send pt to main ED for further evaluation.

## 2021-09-29 NOTE — ED PROVIDER NOTE - PROGRESS NOTE DETAILS
Sunny Bond:  HPI: 79 y/o male presents to the ED s/p fall c.o. lower back pain. Pt. state he fell onto face and hit lower back.  PE: Constitutional: NAD AAOx3. Eyes: PERRLA EOMI. Head: Normocephalic, old ecchymosis to face. Mouth: MMM. Cardiac: regular rate. Resp: Lungs CTAB. GI: Abd s/nt/nd. Neuro: CN2-12 intact. MSK:t enderness to mid-spine. Skin: No rashes   MDM: 79 y/o male s/p mechanical fall; ECG, basic labs, ct, re-evaluate. Varun RAMIREZ: cleared by ortho spine for d/c home; patient refusing MRI lumbar spine as ordered by ortho; patient to f/u with ortho spine as instructed; strict return precautions given. Varun RAMIREZ: cleared by ortho spine for d/c home; patient refusing MRI lumbar spine as ordered by ortho; patient to f/u with ortho spine as instructed; strict return precautions given. patient given copy of all labs, ct scans and notified of all results. Discussed incidental finding of infrarenal AAA on CT abdomen and pelvis with patient. Pt is aware and understands that he needs to follow up with his PCP within 1-2 weeks after discharge for further workup and imaging screening.

## 2021-09-29 NOTE — ED PROVIDER NOTE - PATIENT PORTAL LINK FT
You can access the FollowMyHealth Patient Portal offered by [facility] by registering at the following website: [email or web address]. By joining [employer]’s FollowMyHealth portal, you will also be able to view your health information using other applications (apps) compatible with our system.

## 2021-09-29 NOTE — ED ADULT NURSE NOTE - CHIEF COMPLAINT QUOTE
pt c/o back pain, abrasion to face s/p fall yesterday into a dry well at 2p. pt denies LOC/ blood thinners. daughter also states pt has increased LE edema for several days. Daughter Deanna [phone]

## 2021-09-29 NOTE — ED ADULT TRIAGE NOTE - CHIEF COMPLAINT QUOTE
pt c/o back pain, abrasion to face s/p fall yesterday into a dry well at 2p. pt denies LOC/ blood thinners. daughter also states pt has increased LE edema for several days. Daughter Deanna [phone]

## 2021-09-30 PROBLEM — Z00.00 ENCOUNTER FOR PREVENTIVE HEALTH EXAMINATION: Noted: 2021-09-30

## 2021-09-30 NOTE — PROGRESS NOTE ADULT - SUBJECTIVE AND OBJECTIVE BOX
Pt discussed with the Orthopedic team yesterday  Agree with their assessment and plan  CT scan was reviewed  T12 vertebral body fx s/p fall  Neurologically intact  No acute surgical indications  Pt will F/U as an out patient

## 2021-10-01 ENCOUNTER — APPOINTMENT (OUTPATIENT)
Dept: FAMILY MEDICINE | Facility: CLINIC | Age: 81
End: 2021-10-01
Payer: MEDICARE

## 2021-10-01 VITALS
OXYGEN SATURATION: 97 % | BODY MASS INDEX: 26.95 KG/M2 | WEIGHT: 199 LBS | TEMPERATURE: 98.2 F | HEART RATE: 113 BPM | SYSTOLIC BLOOD PRESSURE: 118 MMHG | HEIGHT: 72 IN | DIASTOLIC BLOOD PRESSURE: 60 MMHG

## 2021-10-01 DIAGNOSIS — S22.000A WEDGE COMPRESSION FRACTURE OF UNSPECIFIED THORACIC VERTEBRA, INITIAL ENCOUNTER FOR CLOSED FRACTURE: ICD-10-CM

## 2021-10-01 DIAGNOSIS — M25.473 EFFUSION, UNSPECIFIED ANKLE: ICD-10-CM

## 2021-10-01 DIAGNOSIS — I71.4 ABDOMINAL AORTIC ANEURYSM, W/OUT RUPTURE: ICD-10-CM

## 2021-10-01 PROCEDURE — 99215 OFFICE O/P EST HI 40 MIN: CPT

## 2021-10-01 NOTE — PLAN
[FreeTextEntry1] : \par \par Reviewed blood work and ct scans with patient/daughter. Discussed ct scan found incidental infrarenal AAA measuring 5.5 cm, discussed significance of this size and borderline surgical candidate. He denies any s/s, educated go back to ed with any severe abd/back pain, chest pain, palpitations, sob, etc. Patient verbalized understanding. He will follow up with Dr. Badillo vascular surgeon - referral placed. Blood work wnl from hospital except rbc count low, has hx of this saw hematology in past they wanted bone marrow biopsy but patient decline as he did not want to do this at his age. We will order echo d/t swelling complaints and check bnp in next blood work set - ekg unchanged from prior ekgs at hospital. Patient will f/u with ortho for compression fracture, continue advil prn pain releif and add in tylenol to alternate. Advised patient/daughter to f/u in a couple weeks once they have seen specialists and gone for echo to touch base and determine next steps. Advised patient to call with any questions or concerns. Patient verbalized understanding.

## 2021-10-01 NOTE — PHYSICAL EXAM
[Normal Sclera/Conjunctiva] : normal sclera/conjunctiva [Coordination Grossly Intact] : coordination grossly intact [No Focal Deficits] : no focal deficits [Normal Gait] : normal gait [Normal] : affect was normal and insight and judgment were intact [No Extremity Clubbing/Cyanosis] : no extremity clubbing/cyanosis [de-identified] : trace edema to ankles

## 2021-10-01 NOTE — HISTORY OF PRESENT ILLNESS
[FreeTextEntry1] : f/u [de-identified] : Patient presents today after a fall two days ago, went to ER and found it a compound fracture of the spine. Had this appointment originally for bilaterally ankle and hand swelling. Left ankle more swollen then right. First noticed 3 days ago before the fall. No trauma to ankle. Taking Advil for pain, states when he is moving pain in his lower back is 10/10 but when he is not moving he is ok. Used bengay yesterday which helped a lot. Has had numbness and tingling in feet and hands for years, was seeing rheum and gave him Prednisone 5mg (BID). Last time he had ankle swelling he ended up in the hospital with ankle swelling as per his daughter with urosepsis. Edema has improved since they first made appt. Patient reports difficulty walking, falling more frequently, feet feel unsteady. Patient reports that the bones in his feet don’t move like they used to causing him to fall more.\par  \par Patient states that the nurse in the ER told him something is wrong with his heart. He was never given any discharge papers and they would not tell him/family what was going on.\par \par \par

## 2021-10-14 ENCOUNTER — NON-APPOINTMENT (OUTPATIENT)
Age: 81
End: 2021-10-14

## 2022-06-17 ENCOUNTER — EMERGENCY (EMERGENCY)
Facility: HOSPITAL | Age: 82
LOS: 0 days | Discharge: HOME | End: 2022-06-17
Attending: EMERGENCY MEDICINE
Payer: MEDICARE

## 2022-06-17 VITALS — WEIGHT: 192.9 LBS | HEIGHT: 72 IN

## 2022-06-17 VITALS
TEMPERATURE: 98 F | SYSTOLIC BLOOD PRESSURE: 131 MMHG | RESPIRATION RATE: 18 BRPM | DIASTOLIC BLOOD PRESSURE: 78 MMHG | HEART RATE: 72 BPM | OXYGEN SATURATION: 97 %

## 2022-06-17 DIAGNOSIS — L03.116 CELLULITIS OF LEFT LOWER LIMB: ICD-10-CM

## 2022-06-17 DIAGNOSIS — M25.572 PAIN IN LEFT ANKLE AND JOINTS OF LEFT FOOT: ICD-10-CM

## 2022-06-17 DIAGNOSIS — M06.9 RHEUMATOID ARTHRITIS, UNSPECIFIED: ICD-10-CM

## 2022-06-17 DIAGNOSIS — R60.9 EDEMA, UNSPECIFIED: ICD-10-CM

## 2022-06-17 PROBLEM — M19.90 UNSPECIFIED OSTEOARTHRITIS, UNSPECIFIED SITE: Chronic | Status: ACTIVE | Noted: 2021-09-29

## 2022-06-17 LAB
ALBUMIN SERPL ELPH-MCNC: 3.9 G/DL — SIGNIFICANT CHANGE UP (ref 3.3–5)
ALP SERPL-CCNC: 62 U/L — SIGNIFICANT CHANGE UP (ref 40–120)
ALT FLD-CCNC: 29 U/L — SIGNIFICANT CHANGE UP (ref 12–78)
ANION GAP SERPL CALC-SCNC: 8 MMOL/L — SIGNIFICANT CHANGE UP (ref 5–17)
AST SERPL-CCNC: 15 U/L — SIGNIFICANT CHANGE UP (ref 15–37)
BASOPHILS # BLD AUTO: 0 K/UL — SIGNIFICANT CHANGE UP (ref 0–0.2)
BASOPHILS NFR BLD AUTO: 0 % — SIGNIFICANT CHANGE UP (ref 0–2)
BILIRUB SERPL-MCNC: 1 MG/DL — SIGNIFICANT CHANGE UP (ref 0.2–1.2)
BUN SERPL-MCNC: 15 MG/DL — SIGNIFICANT CHANGE UP (ref 7–23)
CALCIUM SERPL-MCNC: 8.8 MG/DL — SIGNIFICANT CHANGE UP (ref 8.5–10.1)
CHLORIDE SERPL-SCNC: 113 MMOL/L — HIGH (ref 96–108)
CO2 SERPL-SCNC: 22 MMOL/L — SIGNIFICANT CHANGE UP (ref 22–31)
CREAT SERPL-MCNC: 0.89 MG/DL — SIGNIFICANT CHANGE UP (ref 0.5–1.3)
EGFR: 85 ML/MIN/1.73M2 — SIGNIFICANT CHANGE UP
EOSINOPHIL # BLD AUTO: 0 K/UL — SIGNIFICANT CHANGE UP (ref 0–0.5)
EOSINOPHIL NFR BLD AUTO: 0 % — SIGNIFICANT CHANGE UP (ref 0–6)
GLUCOSE SERPL-MCNC: 98 MG/DL — SIGNIFICANT CHANGE UP (ref 70–99)
HCT VFR BLD CALC: 35.8 % — LOW (ref 39–50)
HGB BLD-MCNC: 12 G/DL — LOW (ref 13–17)
LYMPHOCYTES # BLD AUTO: 1.59 K/UL — SIGNIFICANT CHANGE UP (ref 1–3.3)
LYMPHOCYTES # BLD AUTO: 38 % — SIGNIFICANT CHANGE UP (ref 13–44)
MCHC RBC-ENTMCNC: 33.5 GM/DL — SIGNIFICANT CHANGE UP (ref 32–36)
MCHC RBC-ENTMCNC: 37.3 PG — HIGH (ref 27–34)
MCV RBC AUTO: 111.2 FL — HIGH (ref 80–100)
MONOCYTES # BLD AUTO: 0.54 K/UL — SIGNIFICANT CHANGE UP (ref 0–0.9)
MONOCYTES NFR BLD AUTO: 13 % — SIGNIFICANT CHANGE UP (ref 2–14)
NEUTROPHILS # BLD AUTO: 2.05 K/UL — SIGNIFICANT CHANGE UP (ref 1.8–7.4)
NEUTROPHILS NFR BLD AUTO: 48 % — SIGNIFICANT CHANGE UP (ref 43–77)
NRBC # BLD: SIGNIFICANT CHANGE UP /100 WBCS (ref 0–0)
NT-PROBNP SERPL-SCNC: 365 PG/ML — SIGNIFICANT CHANGE UP (ref 0–450)
PLATELET # BLD AUTO: 219 K/UL — SIGNIFICANT CHANGE UP (ref 150–400)
POTASSIUM SERPL-MCNC: 4 MMOL/L — SIGNIFICANT CHANGE UP (ref 3.5–5.3)
POTASSIUM SERPL-SCNC: 4 MMOL/L — SIGNIFICANT CHANGE UP (ref 3.5–5.3)
PROT SERPL-MCNC: 7.1 GM/DL — SIGNIFICANT CHANGE UP (ref 6–8.3)
RBC # BLD: 3.22 M/UL — LOW (ref 4.2–5.8)
RBC # FLD: 15.5 % — HIGH (ref 10.3–14.5)
SODIUM SERPL-SCNC: 143 MMOL/L — SIGNIFICANT CHANGE UP (ref 135–145)
WBC # BLD: 4.19 K/UL — SIGNIFICANT CHANGE UP (ref 3.8–10.5)
WBC # FLD AUTO: 4.19 K/UL — SIGNIFICANT CHANGE UP (ref 3.8–10.5)

## 2022-06-17 PROCEDURE — 93971 EXTREMITY STUDY: CPT | Mod: 26,LT

## 2022-06-17 PROCEDURE — 96374 THER/PROPH/DIAG INJ IV PUSH: CPT

## 2022-06-17 PROCEDURE — 93971 EXTREMITY STUDY: CPT | Mod: LT

## 2022-06-17 PROCEDURE — 96375 TX/PRO/DX INJ NEW DRUG ADDON: CPT

## 2022-06-17 PROCEDURE — 73590 X-RAY EXAM OF LOWER LEG: CPT | Mod: LT

## 2022-06-17 PROCEDURE — 85025 COMPLETE CBC W/AUTO DIFF WBC: CPT

## 2022-06-17 PROCEDURE — 36415 COLL VENOUS BLD VENIPUNCTURE: CPT

## 2022-06-17 PROCEDURE — 80053 COMPREHEN METABOLIC PANEL: CPT

## 2022-06-17 PROCEDURE — 73610 X-RAY EXAM OF ANKLE: CPT | Mod: LT

## 2022-06-17 PROCEDURE — 99284 EMERGENCY DEPT VISIT MOD MDM: CPT | Mod: 25

## 2022-06-17 PROCEDURE — 73610 X-RAY EXAM OF ANKLE: CPT | Mod: 26,LT

## 2022-06-17 PROCEDURE — 73590 X-RAY EXAM OF LOWER LEG: CPT | Mod: 26,LT

## 2022-06-17 PROCEDURE — 99284 EMERGENCY DEPT VISIT MOD MDM: CPT | Mod: FS

## 2022-06-17 PROCEDURE — 83880 ASSAY OF NATRIURETIC PEPTIDE: CPT

## 2022-06-17 RX ORDER — CEPHALEXIN 500 MG
1 CAPSULE ORAL
Qty: 21 | Refills: 0
Start: 2022-06-17 | End: 2022-06-23

## 2022-06-17 RX ORDER — VANCOMYCIN HCL 1 G
1000 VIAL (EA) INTRAVENOUS ONCE
Refills: 0 | Status: DISCONTINUED | OUTPATIENT
Start: 2022-06-17 | End: 2022-06-17

## 2022-06-17 RX ORDER — VANCOMYCIN HCL 1 G
1250 VIAL (EA) INTRAVENOUS ONCE
Refills: 0 | Status: COMPLETED | OUTPATIENT
Start: 2022-06-17 | End: 2022-06-17

## 2022-06-17 RX ORDER — CEFTRIAXONE 500 MG/1
1000 INJECTION, POWDER, FOR SOLUTION INTRAMUSCULAR; INTRAVENOUS ONCE
Refills: 0 | Status: COMPLETED | OUTPATIENT
Start: 2022-06-17 | End: 2022-06-17

## 2022-06-17 RX ADMIN — Medication 166.67 MILLIGRAM(S): at 14:50

## 2022-06-17 RX ADMIN — CEFTRIAXONE 100 MILLIGRAM(S): 500 INJECTION, POWDER, FOR SOLUTION INTRAMUSCULAR; INTRAVENOUS at 15:08

## 2022-06-17 NOTE — ED STATDOCS - PROGRESS NOTE DETAILS
labs WNL, LE doppler with no DVT, +hematoma, XRs negative, pt given IV abx, offered admission but would like to go home, will d/c home, pt has bactrim rx, will add keflex strict return precautions given including worsening sx, fevers, pt agreeable to d/c and plan of care  Sherry Lou PA-C Patient seen and evaluated, ED attending note and orders reviewed, will continue with patient follow up and care -Sherry Lou PA-C

## 2022-06-17 NOTE — ED STATDOCS - OBJECTIVE STATEMENT
80 yo male w/PMHx presents to the ED with b/l ankle swelling, worse on the left. Pt states the bottom of his left foot is bruised and is painful. No other complaints at this time. Pt went to  and was told to come to the ED to r/o DVT. Denies JUSTUS, CP. Pt recently tripped at his house. Pt took Amoxacillin this morning and was given bactrim in UC but has not started it.

## 2022-06-17 NOTE — ED STATDOCS - WET READ LAUNCH FT
There are no Wet Read(s) to document. Additional Notes: Patient will continue metformin 500 mg BID Detail Level: Simple Render Risk Assessment In Note?: no

## 2022-06-17 NOTE — ED STATDOCS - NS ED ROS FT
Constitutional: nad, well appearing  HEENT:  no nasal congestion, eye drainage or ear pain.    CVS:  no cp  Resp:  No sob, no cough  GI:  no abdominal pain, no nausea or vomiting  :  no dysuria  MSK: no joint pain or limited ROM, +LE edema b/l   Skin: no rash, erythema and increased warmth with left shin central shin. no purulence, no fluctuance   Neuro: no change in mental status or level of consciousness  Heme/lymph: no bleeding

## 2022-06-17 NOTE — ED STATDOCS - NSICDXFAMILYHX_GEN_ALL_CORE_FT
FAMILY HISTORY:  FH: HTN (hypertension)  Patient unable to provide medical history, of parents,  , does not know cause of death

## 2022-06-17 NOTE — ED STATDOCS - NS ED ATTENDING STATEMENT MOD
This was a shared visit with the ZOHRA. I reviewed and verified the documentation and independently performed the documented:

## 2022-06-17 NOTE — ED STATDOCS - CLINICAL SUMMARY MEDICAL DECISION MAKING FREE TEXT BOX
Likely represent cellulitis. Otherwise pt looks well. Will get US to r/o DVT. Will treat with single dose antibx and dc home. Likely represent cellulitis. Otherwise pt looks well. Will get US to r/o DVT. Will treat with single dose antibx and dc home.  NVI.

## 2022-06-17 NOTE — ED STATDOCS - PHYSICAL EXAMINATION
Constitutional: NAD, well appearing  HEENT: no rhinorrhea  CVS:  wwp  Resp:  no resp distress  GI: nd  MSK:  no restriction to rom, full ROM to all extremities, hematoma to distal LLE about ankle with surrounding erythema, no fluctuance.    Neuro:  A&Ox3, moving all extremities equally  Skin: no rash  psych: clear thought content  Heme/lymph:  No LAD

## 2022-06-17 NOTE — ED STATDOCS - PATIENT PORTAL LINK FT
You can access the FollowMyHealth Patient Portal offered by [facility] by registering at the following website: [email or web address]. By joining [employer]’s FollowMyHealth portal, you will also be able to view your health information using other applications (apps) compatible with our system.

## 2022-06-17 NOTE — ED STATDOCS - NSFOLLOWUPINSTRUCTIONS_ED_ALL_ED_FT
Cellulitis    WHAT YOU NEED TO KNOW:    Cellulitis is a skin infection caused by bacteria. Cellulitis may go away on its own or you may need treatment. Your healthcare provider may draw a [place] around the outside edges of your cellulitis. If your cellulitis spreads, your healthcare provider will see it outside of the [place]. Cellulitis          DISCHARGE INSTRUCTIONS:    Call 911 if:     You have sudden trouble breathing or chest pain.        Return to the emergency department if:     Your wound gets larger and more painful.       You feel a crackling under your skin when you touch it.      You have purple dots or bumps on your skin, or you see bleeding under your skin.      You have new swelling and pain in your legs.      The red, warm, swollen area gets larger.      You see red streaks coming from the infected area.    Contact your healthcare provider if:     You have a fever.      Your fever or pain does not go away or gets worse.      The area does not get smaller after 2 days of antibiotics.      Your skin is flaking or peeling off.      You have questions or concerns about your condition or care.    Medicines:     Antibiotics help treat the bacterial infection.       NSAIDs, such as ibuprofen, help decrease swelling, pain, and fever. NSAIDs can cause stomach bleeding or kidney problems in certain people. If you take blood thinner medicine, always ask if NSAIDs are safe for you. Always read the medicine label and follow directions. Do not give these medicines to children under 6 months of age without direction from your child's healthcare provider.      Acetaminophen decreases pain and fever. It is available without a doctor's order. Ask how much to take and how often to take it. Follow directions. Read the labels of all other medicines you are using to see if they also contain acetaminophen, or ask your doctor or pharmacist. Acetaminophen can cause liver damage if not taken correctly. Do not use more than 4 grams (4,000 milligrams) total of acetaminophen in one day.       Take your medicine as directed. Contact your healthcare provider if you think your medicine is not helping or if you have side effects. Tell him or her if you are allergic to any medicine. Keep a list of the medicines, vitamins, and herbs you take. Include the amounts, and when and why you take them. Bring the list or the pill bottles to follow-up visits. Carry your medicine list with you in case of an emergency.    Self-care:     Elevate the area above the level of your heart as often as you can. This will help decrease swelling and pain. Prop the area on pillows or blankets to keep it elevated comfortably.       Clean the area daily until the wound scabs over. Gently wash the area with soap and water. Pat dry. Use dressings as directed.       Place cool or warm, wet cloths on the area as directed. Use clean cloths and clean water. Leave it on the area until the cloth is room temperature. Pat the area dry with a clean, dry cloth. The cloths may help decrease pain.     Prevent cellulitis:     Do not scratch bug bites or areas of injury. You increase your risk for cellulitis by scratching these areas.       Do not share personal items, such as towels, clothing, and razors.       Clean exercise equipment with germ-killing  before and after you use it.      Wash your hands often. Use soap and water. Wash your hands after you use the bathroom, change a child's diapers, or sneeze. Wash your hands before you prepare or eat food. Use lotion to prevent dry, cracked skin. Handwashing           Wear pressure stockings as directed. You may be told to wear the stockings if you have peripheral edema. The stockings improve blood flow and decrease swelling.      Treat athlete’s foot. This can help prevent the spread of a bacterial skin infection.    Follow up with your healthcare provider within 3 days, or as directed: Your healthcare provider will check if your cellulitis is getting better. You may need different medicine. Write down your questions so you remember to ask them during your visits.

## 2024-09-08 NOTE — HISTORY OF PRESENT ILLNESS
[Home] : at home, [unfilled] , at the time of the visit. [Medical Office: ([facility])___] : at the medical office located in  [Family Member] : family member [Patient] : the patient [Self] : self [FreeTextEntry8] : patient with daughter in law who gave most of history \par patient had uti over weekend was seen in urgi care and placed on bactrim\par states his uti symptoms have already gone but patient feels tired\par daughter in law states urig care doc mentioned glucose in urine, states they did a finger stick as well but unsure of number\par patient is drinking, however not eating much bc he feels slightly nauseated from bactrim\par  [id number]

## 2024-09-10 ENCOUNTER — NON-APPOINTMENT (OUTPATIENT)
Age: 84
End: 2024-09-10

## 2024-09-10 ENCOUNTER — APPOINTMENT (OUTPATIENT)
Dept: FAMILY MEDICINE | Facility: CLINIC | Age: 84
End: 2024-09-10
Payer: MEDICARE

## 2024-09-10 VITALS
OXYGEN SATURATION: 97 % | TEMPERATURE: 98 F | SYSTOLIC BLOOD PRESSURE: 135 MMHG | BODY MASS INDEX: 23.98 KG/M2 | WEIGHT: 177 LBS | RESPIRATION RATE: 18 BRPM | HEART RATE: 76 BPM | DIASTOLIC BLOOD PRESSURE: 80 MMHG | HEIGHT: 72 IN

## 2024-09-10 DIAGNOSIS — M25.473 EFFUSION, UNSPECIFIED ANKLE: ICD-10-CM

## 2024-09-10 DIAGNOSIS — R94.31 ABNORMAL ELECTROCARDIOGRAM [ECG] [EKG]: ICD-10-CM

## 2024-09-10 DIAGNOSIS — D47.2 MONOCLONAL GAMMOPATHY: ICD-10-CM

## 2024-09-10 DIAGNOSIS — Z13.1 ENCOUNTER FOR SCREENING FOR DIABETES MELLITUS: ICD-10-CM

## 2024-09-10 DIAGNOSIS — R81 GLYCOSURIA: ICD-10-CM

## 2024-09-10 DIAGNOSIS — M35.3 POLYMYALGIA RHEUMATICA: ICD-10-CM

## 2024-09-10 DIAGNOSIS — I71.43 INFRARENAL ABDOMINAL AORTIC ANEURYSM, WITHOUT RUPTURE: ICD-10-CM

## 2024-09-10 DIAGNOSIS — Z01.818 ENCOUNTER FOR OTHER PREPROCEDURAL EXAMINATION: ICD-10-CM

## 2024-09-10 DIAGNOSIS — R25.2 CRAMP AND SPASM: ICD-10-CM

## 2024-09-10 PROCEDURE — 99204 OFFICE O/P NEW MOD 45 MIN: CPT

## 2024-09-10 PROCEDURE — 93000 ELECTROCARDIOGRAM COMPLETE: CPT

## 2024-09-10 NOTE — ASSESSMENT
[High Risk Surgery - Intraperitoneal, Intrathoracic or Supringuinal Vascular Procedures] : High Risk Surgery - Intraperitoneal, Intrathoracic or Supringuinal Vascular Procedures - No (0) [Ischemic Heart Disease] : Ischemic Heart Disease - No (0) [Congestive Heart Failure] : Congestive Heart Failure - No (0) [Prior Cerebrovascular Accident or TIA] : Prior Cerebrovascular Accident or TIA - No (0) [Creatinine >= 2mg/dL (1 Point)] : Creatinine >= 2mg/dL - No (0) [Insulin-dependent Diabetic (1 Point)] : Insulin-dependent Diabetic - No (0) [Patient NOT optimized for Surgery at this time] : Patient not optimized for surgery at this time [ECG] : ECG [Echocardiogram] : echocardiogram [Cardiology consultation] : Cardiology consultation [FreeTextEntry4] : 84 yo male with history of infrarenal abdominal aortic aneurysm (5.5 cm on CT in 9/2021), monoclonal gammopathy, polymyalgia rheumatica here for preoperative cardiac risk stratification for left cataract surgery on 9/24/2024.   In office EKG was completed. It was difficult obtaining EKG as patient moved due to pain and it was attempted two times. The examination and EKG revealed irregular rhythm (Atrial fibrillation/flutter vs first degree AV block). He is asymptomatic with no symptoms of chest pain, palpitations, dizziness/lightheadedness, or dyspnea at rest or on exertion.

## 2024-09-10 NOTE — PLAN
[FreeTextEntry1] : 1. Abnormal EKG  2. Preoperative Examination  Patient is asymptomatic with normal vital signs at this time. - Patient is not optimized for surgery at this time due to abnormal EKG finding. Will refer to cardiology for further evaluation  - Echocardiogram ordered  - Discussed strict ED precautions including but not limited to chest pain, palpitations, dyspnea, weakness, dizziness/lightheadedness Patient verbalized understanding   2. Abdominal Aortic Aneurysm  3. Left lower leg cramping  - Patient reports declining treatment previously and states he does not want to treat it. Discussed the risk, patient verbalized understanding. However, he is agreeable to getting an updated Ultrasound and speaking to vascular surgery  - US abdominal aorta ordered  - Vascular surgery referral for abdominal aortic aneurysm and as well as evaluation of lower extremity peripheral artery disease given leg cramping with walking and resolution with rest  - US LE ordered to rule out DVT  - Strict ED precautions given   4. Polymyalgia rheumatica - Rheumatology referral placed   5.  monoclonal gammopathy - Heme/Onc referral placed

## 2024-09-10 NOTE — HISTORY OF PRESENT ILLNESS
[Moderate (4-6 METs)] : Moderate (4-6 METs) [Other: _____] : [unfilled] [Aortic Stenosis] : no aortic stenosis [Atrial Fibrillation] : no atrial fibrillation [Coronary Artery Disease] : no coronary artery disease [Recent Myocardial Infarction] : no recent myocardial infarction [Implantable Device/Pacemaker] : no implantable device/pacemaker [Asthma] : no asthma [COPD] : no COPD [Sleep Apnea] : no sleep apnea [Smoker] : not a smoker [Family Member] : no family member with adverse anesthesia reaction/sudden death [Self] : no previous adverse anesthesia reaction [Chronic Anticoagulation] : no chronic anticoagulation [Chronic Kidney Disease] : no chronic kidney disease [Diabetes] : no diabetes [FreeTextEntry1] : Cataract Surgery (Left)  [FreeTextEntry2] : 09/24/24 [FreeTextEntry3] : Dr. Abbie Pearson  [FreeTextEntry4] : 82 yo male with history of abdominal aortic aneurysm (5.5 cm in 2021), monoclonal gammopathy, polymyalgia rheumatica here for preoperative examination for left cataract surgery on 9/24/2024 with request for EKG to be done.  He has not been seen by any doctors since 2021. He has chosen to not treat his abdominal aortic aneurysm.   He complains of chronic diffuse joint pain and stiffness, bilateral lower extremity numbness and swelling, chronic back pain, and left leg cramping. Regarding the left leg cramping, onset is 1 year ago, occurs with walking, improves with rest.  [FreeTextEntry6] : No Chest pain, dyspnea at rest or with exertion, orthopnea, palpitation, syncope   [FreeTextEntry7] : Per daughter, he had full cardiac workup 2020 and was told no stents needed  [FreeTextEntry8] : Able to take care of self, such as eat, dress, or use toilet. Can walk up the stairs or hills = total 5 mets.

## 2024-09-10 NOTE — PHYSICAL EXAM
[No Acute Distress] : no acute distress [Normal Sclera/Conjunctiva] : normal sclera/conjunctiva [Supple] : supple [No Respiratory Distress] : no respiratory distress  [Clear to Auscultation] : lungs were clear to auscultation bilaterally [Normal Rate] : normal rate  [No Edema] : there was no peripheral edema [Soft] : abdomen soft [Non Tender] : non-tender [Non-distended] : non-distended [Speech Grossly Normal] : speech grossly normal [de-identified] : irregular rhythm  [de-identified] : Tenderness to palpation of left popliteal fossa, no masses. No lower extremity swelling  [de-identified] : using a can to ambulate

## 2024-09-10 NOTE — ASSESSMENT
[High Risk Surgery - Intraperitoneal, Intrathoracic or Supringuinal Vascular Procedures] : High Risk Surgery - Intraperitoneal, Intrathoracic or Supringuinal Vascular Procedures - No (0) [Ischemic Heart Disease] : Ischemic Heart Disease - No (0) [Congestive Heart Failure] : Congestive Heart Failure - No (0) [Prior Cerebrovascular Accident or TIA] : Prior Cerebrovascular Accident or TIA - No (0) [Creatinine >= 2mg/dL (1 Point)] : Creatinine >= 2mg/dL - No (0) [Insulin-dependent Diabetic (1 Point)] : Insulin-dependent Diabetic - No (0) [Patient NOT optimized for Surgery at this time] : Patient not optimized for surgery at this time [ECG] : ECG [Echocardiogram] : echocardiogram [Cardiology consultation] : Cardiology consultation [FreeTextEntry4] : 82 yo male with history of infrarenal abdominal aortic aneurysm (5.5 cm on CT in 9/2021), monoclonal gammopathy, polymyalgia rheumatica here for preoperative cardiac risk stratification for left cataract surgery on 9/24/2024.   In office EKG was completed. It was difficult obtaining EKG as patient moved due to pain and it was attempted two times. The examination and EKG revealed irregular rhythm (Atrial fibrillation/flutter vs first degree AV block). He is asymptomatic with no symptoms of chest pain, palpitations, dizziness/lightheadedness, or dyspnea at rest or on exertion.

## 2024-09-10 NOTE — PHYSICAL EXAM
[No Acute Distress] : no acute distress [Normal Sclera/Conjunctiva] : normal sclera/conjunctiva [Supple] : supple [No Respiratory Distress] : no respiratory distress  [Clear to Auscultation] : lungs were clear to auscultation bilaterally [Normal Rate] : normal rate  [No Edema] : there was no peripheral edema [Soft] : abdomen soft [Non Tender] : non-tender [Non-distended] : non-distended [Speech Grossly Normal] : speech grossly normal [de-identified] : irregular rhythm  [de-identified] : Tenderness to palpation of left popliteal fossa, no masses. No lower extremity swelling  [de-identified] : using a can to ambulate

## 2024-09-10 NOTE — REVIEW OF SYSTEMS
[Claudication] : leg claudication [Lower Ext Edema] : lower extremity edema [Joint Pain] : joint pain [Joint Stiffness] : joint stiffness [Back Pain] : back pain [Negative] : Integumentary [Fever] : no fever [Chest Pain] : no chest pain [Palpitations] : no palpitations [Orthopena] : no orthopnea [Shortness Of Breath] : no shortness of breath [Dyspnea on Exertion] : not dyspnea on exertion [Abdominal Pain] : no abdominal pain [Nausea] : no nausea [Constipation] : no constipation [Vomiting] : no vomiting [Dizziness] : no dizziness [Fainting] : no fainting [FreeTextEntry9] : Left lower leg cramp as noted in HPI

## 2024-09-20 ENCOUNTER — APPOINTMENT (OUTPATIENT)
Dept: CARDIOLOGY | Facility: CLINIC | Age: 84
End: 2024-09-20

## 2025-05-30 ENCOUNTER — OFFICE (OUTPATIENT)
Dept: URBAN - METROPOLITAN AREA CLINIC 70 | Facility: CLINIC | Age: 85
Setting detail: OPHTHALMOLOGY
End: 2025-05-30
Payer: MEDICARE

## 2025-05-30 ENCOUNTER — RX ONLY (RX ONLY)
Age: 85
End: 2025-05-30

## 2025-05-30 DIAGNOSIS — H02.135: ICD-10-CM

## 2025-05-30 DIAGNOSIS — H25.12: ICD-10-CM

## 2025-05-30 DIAGNOSIS — Z96.1: ICD-10-CM

## 2025-05-30 DIAGNOSIS — H02.132: ICD-10-CM

## 2025-05-30 PROCEDURE — 99203 OFFICE O/P NEW LOW 30 MIN: CPT | Performed by: STUDENT IN AN ORGANIZED HEALTH CARE EDUCATION/TRAINING PROGRAM

## 2025-05-30 ASSESSMENT — VISUAL ACUITY
OD_BCVA: 20/LP
OS_BCVA: 20/30+2

## 2025-05-30 ASSESSMENT — CONFRONTATIONAL VISUAL FIELD TEST (CVF)
OS_FINDINGS: FULL
OD_FINDINGS: FULL

## 2025-05-30 ASSESSMENT — LID POSITION - ECTROPION
OS_ECTROPION: LLL 3+
OD_ECTROPION: RLL 2+